# Patient Record
Sex: FEMALE | Race: WHITE | Employment: FULL TIME | URBAN - METROPOLITAN AREA
[De-identification: names, ages, dates, MRNs, and addresses within clinical notes are randomized per-mention and may not be internally consistent; named-entity substitution may affect disease eponyms.]

---

## 2018-04-27 ENCOUNTER — OFFICE VISIT (OUTPATIENT)
Dept: SURGERY | Age: 53
End: 2018-04-27

## 2018-04-27 ENCOUNTER — DOCUMENTATION ONLY (OUTPATIENT)
Dept: SURGERY | Age: 53
End: 2018-04-27

## 2018-04-27 VITALS
BODY MASS INDEX: 21.53 KG/M2 | WEIGHT: 134 LBS | HEART RATE: 102 BPM | HEIGHT: 66 IN | DIASTOLIC BLOOD PRESSURE: 67 MMHG | SYSTOLIC BLOOD PRESSURE: 131 MMHG

## 2018-04-27 DIAGNOSIS — D05.11 DUCTAL CARCINOMA IN SITU (DCIS) OF RIGHT BREAST: Primary | ICD-10-CM

## 2018-04-27 NOTE — PROGRESS NOTES
HISTORY OF PRESENT ILLNESS  Viviane Conroy is a 50 y.o. female. HPI  NEW patient presents for consultation at the request of Dr. Antoine Sarmiento for new diagnosis of RIGHT breast DCIS diagnosed after calcifications were seen on her recent mammogram.  Stereotactic biopsy was performed which revealed the DCIS. She has already had a breast MRI in [place]. She is not feeling any breast lumps, has no breast pain, no nipple discharge/retraction or skin change. She had calcifications biopsied in the LEFT breast in 2010, and these were benign. There is no FH of breast or ovarian cancer. Recent imaging has been performed at [facility]    Past Medical History:   Diagnosis Date    Cancer [facility]) 2018    RIGHT breast DCIS       No past surgical history on file. Social History     Social History    Marital status: UNKNOWN     Spouse name: N/A    Number of children: N/A    Years of education: N/A     Occupational History    Not on file. Social History Main Topics    Smoking status: Never Smoker    Smokeless tobacco: Never Used    Alcohol use No    Drug use: No    Sexual activity: Not on file     Other Topics Concern    Not on file     Social History Narrative    No narrative on file       No current outpatient prescriptions on file prior to visit. No current facility-administered medications on file prior to visit. No Known Allergies    OB History     Obstetric Comments     Menarche:  8. LMP: June/July 2017. # of Children:  3. Age at Delivery of First Child:  34.   Hysterectomy/oophorectomy:  NO/NO. Breast Bx:  Yes, LEFT and RIGHT. Hx of Breast Feeding:  No  BCP:  Yes, in the past. Hormone therapy: No                            ROS  Constitutional: Negative. HENT: Negative. Eyes: Negative. Respiratory: Negative. Cardiovascular: Negative. Gastrointestinal: Negative. Genitourinary: Negative. Musculoskeletal: Negative. Skin: Negative. Neurological: Negative. Endo/Heme/Allergies: Negative. Psychiatric/Behavioral: Negative. Physical Exam   Cardiovascular: Normal rate, regular rhythm and normal heart sounds. Pulmonary/Chest: Breath sounds normal. Right breast exhibits no inverted nipple, no mass, no nipple discharge, no skin change and no tenderness. Left breast exhibits no inverted nipple, no mass, no nipple discharge, no skin change and no tenderness. Breasts are symmetrical.       Lymphadenopathy:        Right cervical: No superficial cervical, no deep cervical and no posterior cervical adenopathy present. Left cervical: No superficial cervical, no deep cervical and no posterior cervical adenopathy present. She has no axillary adenopathy. Right axillary: No pectoral and no lateral adenopathy present. Left axillary: No pectoral and no lateral adenopathy present. BREAST ULTRASOUND, Preop planning  Indication:preop planning  right Breast upper outer quadrant   Technique: The area was scanned using a high-frequency linear-array near-field transducer  Findings: Sire barely visible  Impression: Breast cancer  Disposition: Lumpectomy with needle loc    ASSESSMENT and PLAN    ICD-10-CM ICD-9-CM    1. Ductal carcinoma in situ (DCIS) of right breast D05.11 233.0       Pt presents with new diagnosis of RIGHT breast DCIS. We reviewed that it is a small area of disease, and MRI appears normal. As UOQ site is barely visible on US today, I recommend lumpectomy with needle loc. A total of 60 minutes were spent face-to-face with the patient and her  during this encounter and over half of that time was spent on counseling and coordination of care. We discussed in depth the pathology results and need for surgery following MRI for extent of disease and surgical planning, as well as questions from the pt and her .  Reviewed that DCIS is a noninvasive form of breast cancer, but is a high-risk marker for development of invasive breast cancer. Goal of treatment is to reduce risk of recurrence within the breast.     Discussed  options with risks and complications of lumpectomy with needle loc and possible XRT, as well as mastectomy with reconstruction, both unilateral and bilateral with inherent benefits and limitations, both having the same cure rates. Discussed surgery plan to include incision above nipple around areola, and placement of BioZorb. This monique act as a 3D target for XRT, and will also promote better cosmetic results as a \"scaffolding\" for breast tissue. Discussed the use of DCISionRT for risk assessment of recurrence with and without XRT. If lumpectomy margins are positive, we will proceed with a secondary surgery for excision. We reviewed how margins are evaluated during surgery and in the pathology lab. We will f/u with results after surgery. Discussed LNs - she does not need these checked as it is non-invasive cancer. If invasive cancer is demonstrated with PATH, we will check LNs in a second surgery. We discussed adjuvant treatments including XRT, chemo, and hormonal therapy. If invasive breast cancer is found during surgery, we will proceed with XRT. If invasive cancer is not found, I will refer pt to radiation oncologist for consultation as to whether or not to proceed with XRT. At this time, chemo is not likely necessary. We will further review the need for hormonal therapy following surgery and PATH. We had a thorough discussion of pt's questions. Dense breast tissue is troublesome in that it makes mammograms harder to read, but is only a minor risk factor for breast cancer. Because of density, we will need to make sure margins are clear, as it is sometimes harder to differentiate dense and cancerous tissues. Reviewed that \"high grade\" classification will be reevaluated following lumpectomy. Pt has 2 sisters, and wonders whether BRCA testing is necessary.  There is no other family hx of breast or ovarian cancer, and likelihood of gene mutation is low. Pt would like to proceed quickly for her peace of mind. We will schedule lumpectomy in the near future. RIGHT mammo to follow 6 months after surgery. BL annual mammos to resume 1 year following surgery. This plan was reviewed with the patient and patient agrees. All questions were answered.     Written by Maria Isabel Eldridge, as dictated by Dr. Love Beltrán MD.

## 2018-04-27 NOTE — PATIENT INSTRUCTIONS
Learning About Breast Cancer Surgery  What is breast cancer surgery? Surgery is a key part of treatment for breast cancer. The type of surgery you have depends on the size, location, and type of the cancer. It also depends on your overall health and personal preferences. You may have surgery that removes the tumor but does not remove the whole breast. Or you may have surgery to remove the whole breast.  Breast cancer usually needs a combination of treatments. You may have surgery to remove all the cancer that can be seen. But after surgery you may also need radiation, chemotherapy, or hormone therapy. These treatments get rid of any cancer cells that may be left. What surgeries are used? · Surgery that does not remove the whole breast is called breast-conserving surgery (lumpectomy). It removes the tumor in the breast and a small amount of normal tissue around it. It is also called a partial mastectomy. · Surgeries to remove the whole breast are called:  ¨ Total mastectomy. This removes the whole breast, including the nipple. ¨ Nipple-sparing mastectomy. This removes the whole breast but leaves the nipple. ¨ Modified radical mastectomy. This removes the whole breast and the lymph nodes under the arm (axillary lymph nodes). ¨ Radical mastectomy. This removes the whole breast, the chest muscles under the breast, and all the lymph nodes under the arm. During the surgery, the doctor may remove lymph nodes from the armpit. The lymph nodes will be looked at under a microscope. This is used to check if cancer has spread from the breast into the lymph nodes. There are two types of lymph node surgery:  · Axillary lymph node dissection. This removes some or all of the lymph nodes in the armpit. · [place] lymph node biopsy. This removes the lymph nodes that are the first to receive lymph fluid from the tumor. If the sentinel nodes don't contain cancer, it is unlikely that the cancer has spread.  Then the doctor won't remove any more lymph nodes. If there is cancer in the sentinel nodes, the doctor may remove other nearby lymph nodes. What can you expect after surgery? Your doctor will send the breast tissue to a lab for testing. This will help the doctor know more about the type of cancer you have. It may take up to a week or more to get the results back. Your doctor will discuss the results with you. You may meet with a doctor who specializes in cancer treatment (an oncologist) to decide about any other treatment you may need. Your personal preferences are important when choosing a treatment that is right for you. The amount of time you will need to recover depends on the type of surgery you had and whether you need any more treatment. After a mastectomy, some women choose to have surgery to restore the way the breast looked. This surgery is called reconstruction. It is done by a plastic surgeon. It can sometimes be done at the same time as the mastectomy. Or you may choose to have it done later. Some women choose to wear a breast-form (prosthesis) in their bra instead of having reconstructive surgery. Others decide to go without a prosthesis or reconstructive surgery. You choose what feels right for you. If you had a lumpectomy, you will probably look the same in a bra. But your breasts may not match in size or shape after surgery. This depends on the size of your breasts and how much tissue was removed. No matter what kind of surgery you have, you will get information about your treatment. This includes how to prepare, what to expect, and what to do afterward. Follow-up care is a key part of your treatment and safety. Be sure to make and go to all appointments, and call your doctor if you are having problems. It's also a good idea to know your test results and keep a list of the medicines you take. Where can you learn more? Go to http://marguerite-naif.info/.   Enter P020 in the search box to learn more about \"Learning About Breast Cancer Surgery. \"  Current as of: May 12, 2017  Content Version: 11.4  © 8383-1310 [employer], [employer]. Care instructions adapted under license by [employer] (which disclaims liability or warranty for this information). If you have questions about a medical condition or this instruction, always ask your healthcare professional. [address] any warranty or liability for your use of this information.

## 2018-04-27 NOTE — PROGRESS NOTES
Type of Film: [x] CD [] FILMS  Type of Test: [x] MRI [x] MAMMO  From: [facility]  Given to: [facility]  To be Downloaded into PACS:  YES    Patient needs a needle localization

## 2018-04-27 NOTE — PROGRESS NOTES
HISTORY OF PRESENT ILLNESS  Jimbo Hernandez is a 50 y.o. female. HPI    NEW patient presents for consultation at the request of Dr. Lola Cao for new diagnosis of RIGHT breast DCIS diagnosed after calcifications were seen on her recent mammogram.  Stereotactic biopsy was performed which revealed the DCIS. She has already had a breast MRI in [place]. She is not feeling any breast lumps, has no breast pain, no nipple discharge/retraction or skin change. She had calcifications biopsied in the LEFT breast in 2010, and these were benign. There is no FH of breast or ovarian cancer. [facility]    Review of Systems   Constitutional: Negative. HENT: Negative. Eyes: Negative. Respiratory: Negative. Cardiovascular: Negative. Gastrointestinal: Negative. Genitourinary: Negative. Musculoskeletal: Negative. Skin: Negative. Neurological: Negative. Endo/Heme/Allergies: Negative. Psychiatric/Behavioral: Negative.         Physical Exam    ASSESSMENT and PLAN  {ASSESSMENT/PLAN:48239}

## 2018-04-27 NOTE — LETTER
4/30/2018 9:56 AM 
 
Patient:  Alyce Mark YOB: 1965 Date of Visit: 4/27/2018 Dear Dr. Amber Quarles: Thank you for referring Ms. Alyce Mark to me for evaluation/treatment. Below are the relevant portions of my assessment and plan of care. HISTORY OF PRESENT ILLNESS Alyce Mark is a 50 y.o. female. HPI 
NEW patient presents for consultation at the request of Dr. Viry Dougherty for new diagnosis of RIGHT breast DCIS diagnosed after calcifications were seen on her recent mammogram.  Stereotactic biopsy was performed which revealed the DCIS. She has already had a breast MRI in [place]. She is not feeling any breast lumps, has no breast pain, no nipple discharge/retraction or skin change. She had calcifications biopsied in the LEFT breast in 2010, and these were benign. There is no FH of breast or ovarian cancer. Recent imaging has been performed at [facility] Past Medical History:  
Diagnosis Date  Cancer ([facility] [address] 2018 RIGHT breast DCIS No past surgical history on file. Social History Social History  Marital status: UNKNOWN Spouse name: N/A  
 Number of children: N/A  
 Years of education: N/A Occupational History  Not on file. Social History Main Topics  Smoking status: Never Smoker  Smokeless tobacco: Never Used  Alcohol use No  
 Drug use: No  
 Sexual activity: Not on file Other Topics Concern  Not on file Social History Narrative  No narrative on file No current outpatient prescriptions on file prior to visit. No current facility-administered medications on file prior to visit. No Known Allergies OB History Obstetric Comments Menarche:  8. LMP: June/July 2017. # of Children:  3. Age at Delivery of First Child:  34.   Hysterectomy/oophorectomy:  NO/NO. Breast Bx:  Yes, LEFT and RIGHT.   Hx of Breast Feeding:  No  BCP:  Yes, in the past. Hormone therapy: No 
  
 
 
 
 
 
  
  
  
 
 
ROS Constitutional: Negative. HENT: Negative. Eyes: Negative. Respiratory: Negative. Cardiovascular: Negative. Gastrointestinal: Negative. Genitourinary: Negative. Musculoskeletal: Negative. Skin: Negative. Neurological: Negative. Endo/Heme/Allergies: Negative. Psychiatric/Behavioral: Negative. Physical Exam  
Cardiovascular: Normal rate, regular rhythm and normal heart sounds. Pulmonary/Chest: Breath sounds normal. Right breast exhibits no inverted nipple, no mass, no nipple discharge, no skin change and no tenderness. Left breast exhibits no inverted nipple, no mass, no nipple discharge, no skin change and no tenderness. Breasts are symmetrical.  
 
 
Lymphadenopathy:  
     Right cervical: No superficial cervical, no deep cervical and no posterior cervical adenopathy present. Left cervical: No superficial cervical, no deep cervical and no posterior cervical adenopathy present. She has no axillary adenopathy. Right axillary: No pectoral and no lateral adenopathy present. Left axillary: No pectoral and no lateral adenopathy present. BREAST ULTRASOUND, Preop planning Indication:preop planning  right Breast upper outer quadrant Technique: The area was scanned using a high-frequency linear-array near-field transducer Findings: Sire barely visible Impression: Breast cancer Disposition: Lumpectomy with needle loc ASSESSMENT and PLAN 
  ICD-10-CM ICD-9-CM 1. Ductal carcinoma in situ (DCIS) of right breast D05.11 233.0 Pt presents with new diagnosis of RIGHT breast DCIS. We reviewed that it is a small area of disease, and MRI appears normal. As UOQ site is barely visible on US today, I recommend lumpectomy with needle loc.  
A total of 60 minutes were spent face-to-face with the patient and her  during this encounter and over half of that time was spent on counseling and coordination of care. We discussed in depth the pathology results and need for surgery following MRI for extent of disease and surgical planning, as well as questions from the pt and her . Reviewed that DCIS is a noninvasive form of breast cancer, but is a high-risk marker for development of invasive breast cancer. Goal of treatment is to reduce risk of recurrence within the breast.  
 
Discussed  options with risks and complications of lumpectomy with needle loc and possible XRT, as well as mastectomy with reconstruction, both unilateral and bilateral with inherent benefits and limitations, both having the same cure rates. Discussed surgery plan to include incision above nipple around areola, and placement of BioZorb. This monique act as a 3D target for XRT, and will also promote better cosmetic results as a \"scaffolding\" for breast tissue. Discussed the use of DCISionRT for risk assessment of recurrence with and without XRT. If lumpectomy margins are positive, we will proceed with a secondary surgery for excision. We reviewed how margins are evaluated during surgery and in the pathology lab. We will f/u with results after surgery. Discussed LNs - she does not need these checked as it is non-invasive cancer. If invasive cancer is demonstrated with PATH, we will check LNs in a second surgery. We discussed adjuvant treatments including XRT, chemo, and hormonal therapy. If invasive breast cancer is found during surgery, we will proceed with XRT. If invasive cancer is not found, I will refer pt to radiation oncologist for consultation as to whether or not to proceed with XRT. At this time, chemo is not likely necessary. We will further review the need for hormonal therapy following surgery and PATH. We had a thorough discussion of pt's questions.  Dense breast tissue is troublesome in that it makes mammograms harder to read, but is only a minor risk factor for breast cancer. Because of density, we will need to make sure margins are clear, as it is sometimes harder to differentiate dense and cancerous tissues. Reviewed that \"high grade\" classification will be reevaluated following lumpectomy. Pt has 2 sisters, and wonders whether BRCA testing is necessary. There is no other family hx of breast or ovarian cancer, and likelihood of gene mutation is low. Pt would like to proceed quickly for her peace of mind. We will schedule lumpectomy in the near future. RIGHT mammo to follow 6 months after surgery. BL annual mammos to resume 1 year following surgery. This plan was reviewed with the patient and patient agrees. All questions were answered. Written by Stalin La, as dictated by Dr. Nicole Taylor MD. 
 
If you have questions, please do not hesitate to call me. I look forward to following Ms. rBee Joyce along with you.  
 
 
 
Sincerely, 
 
 
Lorin Elder MD

## 2018-04-30 NOTE — COMMUNICATION BODY
HISTORY OF PRESENT ILLNESS  Aidee Valerio is a 50 y.o. female. HPI  NEW patient presents for consultation at the request of Dr. Jomar Omalley for new diagnosis of RIGHT breast DCIS diagnosed after calcifications were seen on her recent mammogram.  Stereotactic biopsy was performed which revealed the DCIS. She has already had a breast MRI in [place]. She is not feeling any breast lumps, has no breast pain, no nipple discharge/retraction or skin change. She had calcifications biopsied in the LEFT breast in 2010, and these were benign. There is no FH of breast or ovarian cancer. Recent imaging has been performed at [facility]    Past Medical History:   Diagnosis Date    Cancer [facility]) 2018    RIGHT breast DCIS       No past surgical history on file. Social History     Social History    Marital status: UNKNOWN     Spouse name: N/A    Number of children: N/A    Years of education: N/A     Occupational History    Not on file. Social History Main Topics    Smoking status: Never Smoker    Smokeless tobacco: Never Used    Alcohol use No    Drug use: No    Sexual activity: Not on file     Other Topics Concern    Not on file     Social History Narrative    No narrative on file       No current outpatient prescriptions on file prior to visit. No current facility-administered medications on file prior to visit. No Known Allergies    OB History     Obstetric Comments     Menarche:  8. LMP: June/July 2017. # of Children:  3. Age at Delivery of First Child:  34.   Hysterectomy/oophorectomy:  NO/NO. Breast Bx:  Yes, LEFT and RIGHT. Hx of Breast Feeding:  No  BCP:  Yes, in the past. Hormone therapy: No                            ROS  Constitutional: Negative. HENT: Negative. Eyes: Negative. Respiratory: Negative. Cardiovascular: Negative. Gastrointestinal: Negative. Genitourinary: Negative. Musculoskeletal: Negative. Skin: Negative. Neurological: Negative. Endo/Heme/Allergies: Negative. Psychiatric/Behavioral: Negative. Physical Exam   Cardiovascular: Normal rate, regular rhythm and normal heart sounds. Pulmonary/Chest: Breath sounds normal. Right breast exhibits no inverted nipple, no mass, no nipple discharge, no skin change and no tenderness. Left breast exhibits no inverted nipple, no mass, no nipple discharge, no skin change and no tenderness. Breasts are symmetrical.       Lymphadenopathy:        Right cervical: No superficial cervical, no deep cervical and no posterior cervical adenopathy present. Left cervical: No superficial cervical, no deep cervical and no posterior cervical adenopathy present. She has no axillary adenopathy. Right axillary: No pectoral and no lateral adenopathy present. Left axillary: No pectoral and no lateral adenopathy present. BREAST ULTRASOUND, Preop planning  Indication:preop planning  right Breast upper outer quadrant   Technique: The area was scanned using a high-frequency linear-array near-field transducer  Findings: Sire barely visible  Impression: Breast cancer  Disposition: Lumpectomy with needle loc    ASSESSMENT and PLAN    ICD-10-CM ICD-9-CM    1. Ductal carcinoma in situ (DCIS) of right breast D05.11 233.0       Pt presents with new diagnosis of RIGHT breast DCIS. We reviewed that it is a small area of disease, and MRI appears normal. As UOQ site is barely visible on US today, I recommend lumpectomy with needle loc. A total of 60 minutes were spent face-to-face with the patient and her  during this encounter and over half of that time was spent on counseling and coordination of care. We discussed in depth the pathology results and need for surgery following MRI for extent of disease and surgical planning, as well as questions from the pt and her .  Reviewed that DCIS is a noninvasive form of breast cancer, but is a high-risk marker for development of invasive breast cancer. Goal of treatment is to reduce risk of recurrence within the breast.     Discussed  options with risks and complications of lumpectomy with needle loc and possible XRT, as well as mastectomy with reconstruction, both unilateral and bilateral with inherent benefits and limitations, both having the same cure rates. Discussed surgery plan to include incision above nipple around areola, and placement of BioZorb. This monique act as a 3D target for XRT, and will also promote better cosmetic results as a \"scaffolding\" for breast tissue. Discussed the use of DCISionRT for risk assessment of recurrence with and without XRT. If lumpectomy margins are positive, we will proceed with a secondary surgery for excision. We reviewed how margins are evaluated during surgery and in the pathology lab. We will f/u with results after surgery. Discussed LNs - she does not need these checked as it is non-invasive cancer. If invasive cancer is demonstrated with PATH, we will check LNs in a second surgery. We discussed adjuvant treatments including XRT, chemo, and hormonal therapy. If invasive breast cancer is found during surgery, we will proceed with XRT. If invasive cancer is not found, I will refer pt to radiation oncologist for consultation as to whether or not to proceed with XRT. At this time, chemo is not likely necessary. We will further review the need for hormonal therapy following surgery and PATH. We had a thorough discussion of pt's questions. Dense breast tissue is troublesome in that it makes mammograms harder to read, but is only a minor risk factor for breast cancer. Because of density, we will need to make sure margins are clear, as it is sometimes harder to differentiate dense and cancerous tissues. Reviewed that \"high grade\" classification will be reevaluated following lumpectomy. Pt has 2 sisters, and wonders whether BRCA testing is necessary.  There is no other family hx of breast or ovarian cancer, and likelihood of gene mutation is low. Pt would like to proceed quickly for her peace of mind. We will schedule lumpectomy in the near future. RIGHT mammo to follow 6 months after surgery. BL annual mammos to resume 1 year following surgery. This plan was reviewed with the patient and patient agrees. All questions were answered.     Written by Vipul Berry, as dictated by Dr. Delano Morley MD.

## 2018-05-01 DIAGNOSIS — D05.11 DUCTAL CARCINOMA IN SITU OF RIGHT BREAST: Primary | ICD-10-CM

## 2018-05-01 RX ORDER — IBUPROFEN 200 MG
TABLET ORAL
COMMUNITY

## 2018-05-01 NOTE — PERIOP NOTES
Patient states she had chest pain/pressure May 2017. Patient stated  she had a stress test May 2017 and it was normal. Patient states she will bring a copy of the stress test results and recent lab studies for her paper medical record.

## 2018-05-01 NOTE — PERIOP NOTES
[facility]  PREOPERATIVE INSTRUCTIONS    Surgery Date:   5/15/18     1. Surgery arrival time given by surgeon: YES 0745  2. Report  to the 1st  Floor Admitting Desk on the day of your surgery. Bring your insurance card, photo identification, and any copayment (if applicable). 3. You must have a responsible adult to drive you home and stay with you the first 24 hours after surgery if you are going home the same day of your surgery. 4. Nothing to eat or drink after midnight the night before surgery. This means NO water, gum, mints, coffee, juice, etc.    5. MEDICATIONS TO TAKE THE MORNING OF SURGERY WITH A SIP OF WATER:none  6. No alcoholic beverages 24 hours before and after your surgery. 7. If you are being admitted to the hospital,please leave personal belongings/luggage in your car until you have an assigned hospital room number. ( The hospital discharge time is 12 PM NOON. Your adult  should be at the hospital prior to the noon discharge time unless otherwise instructed.)   8. STOP Aspirin and/or any non-steroidal anti-inflammatory drugs (i.e. Ibuprofen, Naproxen, Advil, Aleve) as directed by your surgeon. You may take Tylenol. Stop herbal supplements 1 week prior to  surgery. 9. If you are currently taking Plavix, Coumadin,or any other blood-thinning/ anticoagulant medication contact your surgeon for instructions. 10. Wear comfortable clothes. Wear your glasses instead of contacts. Please leave all money, jewelry and valuables at home. No make up, particularly mascara, the day of surgery. 11.  REMOVE ALL body piercings, rings,and jewelry and leave at home. Wear your hair loose or down, no pony-tails, buns, or any metal hair clips. 12. If you shower the morning of surgery, please do not apply any lotions, powders, or deodorants afterwards. Do not shave any body area within 24 hours of your surgery. 13. Please follow all instructions to avoid any potential surgical cancellation.   14.  Should your physical condition change, (i.e. fever, cold, flu, etc.) please notify your surgeon as soon as possible. 15. It is important to be on time. If a situation occurs where you may be delayed, please call:  [phone] on the day of surgery. 16. The Preadmission Testing staff can be reached at 21 [phone]. 17. Special instructions: Free  parking,wear bra for support  18. The patient was contacted  via phone. She  verbalize  understanding of all instructions does not  need reinforcement.

## 2018-05-10 ENCOUNTER — DOCUMENTATION ONLY (OUTPATIENT)
Dept: SURGERY | Age: 53
End: 2018-05-10

## 2018-05-10 NOTE — PROGRESS NOTES
Patient called and left message to return her call. I called but there was no answer. I left a message to return my call at [phone] ext. [phone]  Patients records have been scanned into her chart.

## 2018-05-11 ENCOUNTER — DOCUMENTATION ONLY (OUTPATIENT)
Dept: SURGERY | Age: 53
End: 2018-05-11

## 2018-05-11 NOTE — PROGRESS NOTES
Patient called on Thursday with a question about her medical records. I tried to call her back but only left a message. I called her again today and spoke with her and let her know the records were scanned into her chart. I also called Clemente Villegas in PAT at [facility], to inform the department the  records were there as requested I believe by David Hirsch.

## 2018-05-14 ENCOUNTER — ANESTHESIA EVENT (OUTPATIENT)
Dept: SURGERY | Age: 53
End: 2018-05-14
Payer: COMMERCIAL

## 2018-05-15 ENCOUNTER — HOSPITAL ENCOUNTER (OUTPATIENT)
Age: 53
Setting detail: OUTPATIENT SURGERY
Discharge: HOME | End: 2018-05-15
Attending: SURGERY | Admitting: SURGERY
Payer: COMMERCIAL

## 2018-05-15 ENCOUNTER — APPOINTMENT (OUTPATIENT)
Dept: GENERAL RADIOLOGY | Age: 53
End: 2018-05-15
Attending: SURGERY
Payer: COMMERCIAL

## 2018-05-15 ENCOUNTER — HOSPITAL ENCOUNTER (OUTPATIENT)
Dept: MAMMOGRAPHY | Age: 53
Discharge: HOME | End: 2018-05-15
Attending: SURGERY
Payer: COMMERCIAL

## 2018-05-15 ENCOUNTER — ANESTHESIA (OUTPATIENT)
Dept: SURGERY | Age: 53
End: 2018-05-15
Payer: COMMERCIAL

## 2018-05-15 VITALS
SYSTOLIC BLOOD PRESSURE: 123 MMHG | WEIGHT: 134.92 LBS | HEIGHT: 66 IN | DIASTOLIC BLOOD PRESSURE: 75 MMHG | TEMPERATURE: 98.1 F | RESPIRATION RATE: 13 BRPM | BODY MASS INDEX: 21.68 KG/M2 | HEART RATE: 78 BPM | OXYGEN SATURATION: 95 %

## 2018-05-15 DIAGNOSIS — D05.11 DUCTAL CARCINOMA IN SITU OF RIGHT BREAST: ICD-10-CM

## 2018-05-15 PROCEDURE — 77030020782 HC GWN BAIR PAWS FLX 3M -B

## 2018-05-15 PROCEDURE — 77030018836 HC SOL IRR NACL ICUM -A: Performed by: SURGERY

## 2018-05-15 PROCEDURE — 77030011267 HC ELECTRD BLD COVD -A: Performed by: SURGERY

## 2018-05-15 PROCEDURE — 76210000034 HC AMBSU PH I REC 0.5 TO 1 HR: Performed by: SURGERY

## 2018-05-15 PROCEDURE — 76030000001 HC AMB SURG OR TIME 1 TO 1.5: Performed by: SURGERY

## 2018-05-15 PROCEDURE — 77030002933 HC SUT MCRYL J&J -A: Performed by: SURGERY

## 2018-05-15 PROCEDURE — 88307 TISSUE EXAM BY PATHOLOGIST: CPT | Performed by: SURGERY

## 2018-05-15 PROCEDURE — 77030003460 HC NDL BIOP BRST COOK -B

## 2018-05-15 PROCEDURE — 77030032490 HC SLV COMPR SCD KNE COVD -B: Performed by: SURGERY

## 2018-05-15 PROCEDURE — 77030002966 HC SUT PDS J&J -A: Performed by: SURGERY

## 2018-05-15 PROCEDURE — 74011000250 HC RX REV CODE- 250: Performed by: SURGERY

## 2018-05-15 PROCEDURE — 76060000062 HC AMB SURG ANES 1 TO 1.5 HR: Performed by: SURGERY

## 2018-05-15 PROCEDURE — 74011250636 HC RX REV CODE- 250/636: Performed by: SURGERY

## 2018-05-15 PROCEDURE — 74011250636 HC RX REV CODE- 250/636

## 2018-05-15 PROCEDURE — 76000 FLUOROSCOPY <1 HR PHYS/QHP: CPT

## 2018-05-15 PROCEDURE — 74011250636 HC RX REV CODE- 250/636: Performed by: ANESTHESIOLOGY

## 2018-05-15 PROCEDURE — 88360 TUMOR IMMUNOHISTOCHEM/MANUAL: CPT | Performed by: SURGERY

## 2018-05-15 PROCEDURE — 77030039266 HC ADH SKN EXOFIN S2SG -A: Performed by: SURGERY

## 2018-05-15 PROCEDURE — A4648 IMPLANTABLE TISSUE MARKER: HCPCS | Performed by: SURGERY

## 2018-05-15 PROCEDURE — 77030020143 HC AIRWY LARYN INTUB CGAS -A: Performed by: ANESTHESIOLOGY

## 2018-05-15 PROCEDURE — 74011000250 HC RX REV CODE- 250: Performed by: RADIOLOGY

## 2018-05-15 PROCEDURE — 74011000250 HC RX REV CODE- 250

## 2018-05-15 PROCEDURE — 76210000050 HC AMBSU PH II REC 0.5 TO 1 HR: Performed by: SURGERY

## 2018-05-15 PROCEDURE — 77030002996 HC SUT SLK J&J -A: Performed by: SURGERY

## 2018-05-15 PROCEDURE — 77030031139 HC SUT VCRL2 J&J -A: Performed by: SURGERY

## 2018-05-15 PROCEDURE — 19281 PERQ DEVICE BREAST 1ST IMAG: CPT

## 2018-05-15 DEVICE — THE MARKER IS A RADIOGRAPHIC IMPLANTABLE MARKER USED TO MARK SOFT TISSUE.IT IS COMPRISED OF A BIOABSORBABLE SPACER THAT HOLDS RADIOPAQUE MARKER CLIPS.
Type: IMPLANTABLE DEVICE | Site: BREAST | Status: FUNCTIONAL
Brand: BIOZORB LP MARKER

## 2018-05-15 RX ORDER — PROPOFOL 10 MG/ML
INJECTION, EMULSION INTRAVENOUS
Status: DISCONTINUED | OUTPATIENT
Start: 2018-05-15 | End: 2018-05-15 | Stop reason: HOSPADM

## 2018-05-15 RX ORDER — LIDOCAINE HYDROCHLORIDE 20 MG/ML
INJECTION, SOLUTION EPIDURAL; INFILTRATION; INTRACAUDAL; PERINEURAL AS NEEDED
Status: DISCONTINUED | OUTPATIENT
Start: 2018-05-15 | End: 2018-05-15 | Stop reason: HOSPADM

## 2018-05-15 RX ORDER — SODIUM CHLORIDE, SODIUM LACTATE, POTASSIUM CHLORIDE, CALCIUM CHLORIDE 600; 310; 30; 20 MG/100ML; MG/100ML; MG/100ML; MG/100ML
100 INJECTION, SOLUTION INTRAVENOUS CONTINUOUS
Status: DISCONTINUED | OUTPATIENT
Start: 2018-05-15 | End: 2018-05-15 | Stop reason: HOSPADM

## 2018-05-15 RX ORDER — ONDANSETRON 2 MG/ML
INJECTION INTRAMUSCULAR; INTRAVENOUS AS NEEDED
Status: DISCONTINUED | OUTPATIENT
Start: 2018-05-15 | End: 2018-05-15 | Stop reason: HOSPADM

## 2018-05-15 RX ORDER — LIDOCAINE HYDROCHLORIDE AND EPINEPHRINE 10; 10 MG/ML; UG/ML
30 INJECTION, SOLUTION INFILTRATION; PERINEURAL ONCE
Status: DISCONTINUED | OUTPATIENT
Start: 2018-05-15 | End: 2018-05-15 | Stop reason: HOSPADM

## 2018-05-15 RX ORDER — CEFAZOLIN SODIUM/WATER 2 G/20 ML
2 SYRINGE (ML) INTRAVENOUS
Status: COMPLETED | OUTPATIENT
Start: 2018-05-15 | End: 2018-05-15

## 2018-05-15 RX ORDER — SODIUM CHLORIDE 0.9 % (FLUSH) 0.9 %
5-10 SYRINGE (ML) INJECTION AS NEEDED
Status: DISCONTINUED | OUTPATIENT
Start: 2018-05-15 | End: 2018-05-15 | Stop reason: HOSPADM

## 2018-05-15 RX ORDER — DEXAMETHASONE SODIUM PHOSPHATE 4 MG/ML
INJECTION, SOLUTION INTRA-ARTICULAR; INTRALESIONAL; INTRAMUSCULAR; INTRAVENOUS; SOFT TISSUE AS NEEDED
Status: DISCONTINUED | OUTPATIENT
Start: 2018-05-15 | End: 2018-05-15 | Stop reason: HOSPADM

## 2018-05-15 RX ORDER — HYDROMORPHONE HYDROCHLORIDE 2 MG/ML
.25-1 INJECTION, SOLUTION INTRAMUSCULAR; INTRAVENOUS; SUBCUTANEOUS
Status: DISCONTINUED | OUTPATIENT
Start: 2018-05-15 | End: 2018-05-15 | Stop reason: HOSPADM

## 2018-05-15 RX ORDER — LIDOCAINE HYDROCHLORIDE 10 MG/ML
4 INJECTION, SOLUTION EPIDURAL; INFILTRATION; INTRACAUDAL; PERINEURAL
Status: COMPLETED | OUTPATIENT
Start: 2018-05-15 | End: 2018-05-15

## 2018-05-15 RX ORDER — SODIUM CHLORIDE 0.9 % (FLUSH) 0.9 %
5-10 SYRINGE (ML) INJECTION EVERY 8 HOURS
Status: DISCONTINUED | OUTPATIENT
Start: 2018-05-15 | End: 2018-05-15 | Stop reason: HOSPADM

## 2018-05-15 RX ORDER — FAMOTIDINE 10 MG/ML
INJECTION INTRAVENOUS AS NEEDED
Status: DISCONTINUED | OUTPATIENT
Start: 2018-05-15 | End: 2018-05-15 | Stop reason: HOSPADM

## 2018-05-15 RX ORDER — PROPOFOL 10 MG/ML
INJECTION, EMULSION INTRAVENOUS AS NEEDED
Status: DISCONTINUED | OUTPATIENT
Start: 2018-05-15 | End: 2018-05-15 | Stop reason: HOSPADM

## 2018-05-15 RX ORDER — MIDAZOLAM HYDROCHLORIDE 1 MG/ML
INJECTION, SOLUTION INTRAMUSCULAR; INTRAVENOUS AS NEEDED
Status: DISCONTINUED | OUTPATIENT
Start: 2018-05-15 | End: 2018-05-15 | Stop reason: HOSPADM

## 2018-05-15 RX ORDER — FENTANYL CITRATE 50 UG/ML
INJECTION, SOLUTION INTRAMUSCULAR; INTRAVENOUS AS NEEDED
Status: DISCONTINUED | OUTPATIENT
Start: 2018-05-15 | End: 2018-05-15 | Stop reason: HOSPADM

## 2018-05-15 RX ORDER — HYDROCODONE BITARTRATE AND ACETAMINOPHEN 7.5; 325 MG/1; MG/1
1 TABLET ORAL
Qty: 25 TAB | Refills: 0 | Status: SHIPPED | OUTPATIENT
Start: 2018-05-15 | End: 2019-06-25 | Stop reason: ALTCHOICE

## 2018-05-15 RX ORDER — BUPIVACAINE HYDROCHLORIDE AND EPINEPHRINE 5; 5 MG/ML; UG/ML
30 INJECTION, SOLUTION EPIDURAL; INTRACAUDAL; PERINEURAL ONCE
Status: DISCONTINUED | OUTPATIENT
Start: 2018-05-15 | End: 2018-05-15 | Stop reason: HOSPADM

## 2018-05-15 RX ORDER — LIDOCAINE HYDROCHLORIDE 10 MG/ML
0.1 INJECTION, SOLUTION EPIDURAL; INFILTRATION; INTRACAUDAL; PERINEURAL AS NEEDED
Status: DISCONTINUED | OUTPATIENT
Start: 2018-05-15 | End: 2018-05-15 | Stop reason: HOSPADM

## 2018-05-15 RX ADMIN — SODIUM CHLORIDE, SODIUM LACTATE, POTASSIUM CHLORIDE, AND CALCIUM CHLORIDE: 600; 310; 30; 20 INJECTION, SOLUTION INTRAVENOUS at 11:35

## 2018-05-15 RX ADMIN — LIDOCAINE HYDROCHLORIDE 1 ML: 10 INJECTION, SOLUTION EPIDURAL; INFILTRATION; INTRACAUDAL; PERINEURAL at 10:05

## 2018-05-15 RX ADMIN — FAMOTIDINE 20 MG: 10 INJECTION INTRAVENOUS at 10:57

## 2018-05-15 RX ADMIN — PROPOFOL 120 MCG/KG/MIN: 10 INJECTION, EMULSION INTRAVENOUS at 10:59

## 2018-05-15 RX ADMIN — Medication 2 G: at 11:03

## 2018-05-15 RX ADMIN — SODIUM CHLORIDE, SODIUM LACTATE, POTASSIUM CHLORIDE, AND CALCIUM CHLORIDE 100 ML/HR: 600; 310; 30; 20 INJECTION, SOLUTION INTRAVENOUS at 10:17

## 2018-05-15 RX ADMIN — FENTANYL CITRATE 50 MCG: 50 INJECTION, SOLUTION INTRAMUSCULAR; INTRAVENOUS at 11:10

## 2018-05-15 RX ADMIN — LIDOCAINE HYDROCHLORIDE 60 MG: 20 INJECTION, SOLUTION EPIDURAL; INFILTRATION; INTRACAUDAL; PERINEURAL at 10:59

## 2018-05-15 RX ADMIN — DEXAMETHASONE SODIUM PHOSPHATE 8 MG: 4 INJECTION, SOLUTION INTRA-ARTICULAR; INTRALESIONAL; INTRAMUSCULAR; INTRAVENOUS; SOFT TISSUE at 11:03

## 2018-05-15 RX ADMIN — ONDANSETRON 4 MG: 2 INJECTION INTRAMUSCULAR; INTRAVENOUS at 11:06

## 2018-05-15 RX ADMIN — FENTANYL CITRATE 100 MCG: 50 INJECTION, SOLUTION INTRAMUSCULAR; INTRAVENOUS at 10:59

## 2018-05-15 RX ADMIN — PROPOFOL 150 MG: 10 INJECTION, EMULSION INTRAVENOUS at 10:59

## 2018-05-15 RX ADMIN — FENTANYL CITRATE 50 MCG: 50 INJECTION, SOLUTION INTRAMUSCULAR; INTRAVENOUS at 11:21

## 2018-05-15 RX ADMIN — HYDROMORPHONE HYDROCHLORIDE 0.25 MG: 2 INJECTION INTRAMUSCULAR; INTRAVENOUS; SUBCUTANEOUS at 12:40

## 2018-05-15 RX ADMIN — MIDAZOLAM HYDROCHLORIDE 2 MG: 1 INJECTION, SOLUTION INTRAMUSCULAR; INTRAVENOUS at 10:52

## 2018-05-15 NOTE — PROGRESS NOTES
POST ANESTHESIA CARE    DISCHARGE / TRANSFER NOTE    Halie Mckeon was   discharged     via    wheel chair     to    private vehicle    . Patient was escorted by  nurse    . Patient verbalized   appreciation and was very pleased with care received   throughout their stay. Patient was discharged in     pleasant mood   . Pain at discharge/transfer was    1 /10. Some dizziness and nausea but improved with QueasEase and lying still. Discharge, medication and follow-up instructions were verbalized as understood prior to discharge  (if applicable for same-day procedures being discharged.)    All personal belongings have been returned to patient, and patient/family verbally confirm receiving belongings as all present.     Signed: [facility] BSN RN-BC

## 2018-05-15 NOTE — IP AVS SNAPSHOT
[address] 
[phone] Patient: Emory Mccall MRN: [id number] About your hospitalization You were admitted on:  May 15, 2018 You last received care in the:  [facility] PAC[facility] You were discharged on:  May 15, 2018 Why you were hospitalized Your primary diagnosis was:  Not on File Follow-up Information Follow up With Details Comments Contact Info Kong Carrillo, MD   Patient can only remember the practice name and not the physician Your Scheduled Appointments Friday June 08, 2018 11:55 AM EDT  
POST OP with Andrew Persaud MD  
[address] [facility] [address] [address] [address] [place] [address]  
[phone] Discharge Orders None A check karlos indicates which time of day the medication should be taken. My Medications START taking these medications Instructions Each Dose to Equal  
 Morning Noon Evening Bedtime HYDROcodone-acetaminophen 7.5-325 mg per tablet Commonly known as:  Zoila Brady Notes to Patient:  Take as needed for post-operative pain according to the directions on the label. Take 1 Tab by mouth every four (4) hours as needed for Pain. Max Daily Amount: 6 Tabs. 1 Tab CONTINUE taking these medications Instructions Each Dose to Equal  
 Morning Noon Evening Bedtime ADVIL 200 mg tablet Generic drug:  ibuprofen Your last dose was:  4/15/2018 Notes to Patient:  Take as needed according to package instructions. Take  by mouth every six (6) hours as needed for Pain. Where to Get Your Medications Information on where to get these meds will be given to you by the nurse or doctor. ! Ask your nurse or doctor about these medications HYDROcodone-acetaminophen 7.5-325 mg per tablet Opioid Education Prescription Opioids: What You Need to Know: 
 
Prescription opioids can be used to help relieve moderate-to-severe pain and are often prescribed following a surgery or injury, or for certain health conditions. These medications can be an important part of treatment but also come with serious risks. Opioids are strong pain medicines. Examples include hydrocodone, oxycodone, fentanyl, and morphine. Heroin is an example of an illegal opioid. It is important to work with your health care provider to make sure you are getting the safest, most effective care. WHAT ARE THE RISKS AND SIDE EFFECTS OF OPIOID USE? Prescription opioids carry serious risks of addiction and overdose, especially with prolonged use. An opioid overdose, often marked by slow breathing, can cause sudden death. The use of prescription opioids can have a number of side effects as well, even when taken as directed. · Tolerance-meaning you might need to take more of a medication for the same pain relief · Physical dependence-meaning you have symptoms of withdrawal when the medication is stopped. Withdrawal symptoms can include nausea, sweating, chills, diarrhea, stomach cramps, and muscle aches. Withdrawal can last up to several weeks, depending on which drug you took and how long you took it. · Increased sensitivity to pain · Constipation · Nausea, vomiting, and dry mouth · Sleepiness and dizziness · Confusion · Depression · Low levels of testosterone that can result in lower sex drive, energy, and strength · Itching and sweating RISKS ARE GREATER WITH:      
· History of drug misuse, substance use disorder, or overdose · Mental health conditions (such as depression or anxiety) · Sleep apnea · Older age (72 years or older) · Pregnancy Avoid alcohol while taking prescription opioids. Also, unless specifically advised by your health care provider, medications to avoid include: · Benzodiazepines (such as Xanax or Valium) · Muscle relaxants (such as Soma or Flexeril) · Hypnotics (such as Ambien or Lunesta) · Other prescription opioids KNOW YOUR OPTIONS Talk to your health care provider about ways to manage your pain that don't involve prescription opioids. Some of these options may actually work better and have fewer risks and side effects. Options may include: 
· Pain relievers such as acetaminophen, ibuprofen, and naproxen · Some medications that are also used for depression or seizures · Physical therapy and exercise · Counseling to help patients learn how to cope better with triggers of pain and stress. · Application of heat or cold compress · Massage therapy · Relaxation techniques Be Informed Make sure you know the name of your medication, how much and how often to take it, and its potential risks & side effects. IF YOU ARE PRESCRIBED OPIOIDS FOR PAIN: 
· Never take opioids in greater amounts or more often than prescribed. Remember the goal is not to be pain-free but to manage your pain at a tolerable level. · Follow up with your primary care provider to: · Work together to create a plan on how to manage your pain. · Talk about ways to help manage your pain that don't involve prescription opioids. · Talk about any and all concerns and side effects. · Help prevent misuse and abuse. · Never sell or share prescription opioids · Help prevent misuse and abuse. · Store prescription opioids in a secure place and out of reach of others (this may include visitors, children, friends, and family). · Safely dispose of unused/unwanted prescription opioids: Find your community drug take-back program or your pharmacy mail-back program, or flush them down the toilet, following guidance from the Food and Drug Administration (www.fda.gov/Drugs/ResourcesForYou). · Visit www.cdc.gov/drugoverdose to learn about the risks of opioid abuse and overdose. · If you believe you may be struggling with addiction, tell your health care provider and ask for guidance or call Leeann Montoya at 3-048-390-QTDH. Discharge Instructions Discharge Instructions from Dr. Randall Rashid · I will call you with the pathology results, typically within 1 week from today. · You may shower, but no hot tubs, swimming pools, or baths until your incision is healed. · No heavy lifting with the affected extremity (nothing greater than 5 pounds), and limit its use for the next 4-5 days. · You may use an ice pack for comfort for the next couple of days, but do not place ice directly on the skin. Rather, use a towel or clothing to serve as a barrier between skin and ice to prevent injury. · If I placed a drain, follow the drain instructions provided, especially as you keep a record of the drain output. · Follow medication instructions carefully. · Watch for signs of infection as listed below. · Redness · Swelling · Drainage from the incision or from your nipple that appears infected · Fever over 101 degrees for consecutive readings, or over 99.5 if you are currently undergoing chemotherapy. · Call our office (number is below) for a follow-up appointment. · If you have any problems, our phone number is [phone]. How to Care for Your Wound After Its Treated With DERMABOND®  Topical Skin Adhesive DERMABOND® Topical Skin Adhesive (2-octyl cyanoacrylate) is a sterile, liquid skin adhesive that holds wound edges together. The film will usually remain in place for 5-10 days, then naturally fall off your skin. The following will answer some of your questions and provide instructions for proper care for your wound while it is healing: CHECK THE WOUND APPEARANCE 
?  Some swelling, redness, and pain are common with all wounds and normally will go away as the wound heals. If swelling, redness, or pain increase or if the wound feels warm to the touch, contact your doctor. Also contact your doctor if the wound edges reopen or separate. REPLACE BANDAGES 
? If your wound is bandaged, keep the bandage dry. ? Replace the bandage daily until the adhesive film has fallen off of if the bandage should become wet, unless otherwise instructed by your doctor. ? When changing the dressing, do not place tape directly over the DERMABOND adhesive film, because removing the tape later may also remove the film. AVOIDING TOPICAL MEDICATIONS ? Do not apply liquid or ointment medications or any other product to your wound while the DERMABOND adhesive film is in place. These may loosen the film before your wound is healed. KEEP WOUND DRY AND PROTECTED ? You may occasionally and briefly wet your wound in the shower if permitted by your surgeon. Do not soak or scrub your wound, do not swim, and avoid periods of heavy perspiration until the DERMABOND has naturally fallen off. After showering, gently blot your wound dry with a soft towel. If a protective dressing is being used, apply a fresh, dry bandage, being sure to keep the tape off the DERMABOND adhesive film. ? Apply a clean, dry bandage over the wound if necessary to protect it. ? Protect your wound from injury until the skin has had sufficient time to heal 
? Do not scratch, rub, or pick at the DERMABOND adhesive film. This may loosen the film before your wound is healed. ? Protect the wound from prolonged exposure to sunlight or tanning lamps while the film is in place. If you have any questions or concerns about this product, please consult your doctor. ® DERMABOND is a registered [address] [place] [address] 800 [facility] 2002 DISCHARGE SUMMARY from your Nurse PATIENT INSTRUCTIONS After general anesthesia or intravenous sedation, for 24 hours or while taking prescription Narcotics: · Limit your activities · Do not drive and operate hazardous machinery · Do not make important personal or business decisions · Do  not drink alcoholic beverages · If you have not urinated within 8 hours after discharge, please contact your surgeon on call. Report the following to your surgeon: 
· Excessive pain, swelling, redness or odor of or around the surgical area · Temperature over 100.5 · Nausea and vomiting lasting longer than 4 hours or if unable to take medications · Any signs of decreased circulation or nerve impairment to extremity: change in color, persistent  numbness, tingling, coldness or increase pain · Any questions [address] Breathing deeply and coughing are very important exercises to do after surgery. Deep breathing and coughing open the little air tubes and air sacks in your lungs. You take deep breaths every day. You may not even notice - it is just something you do when you sigh or yawn. It is a natural exercise you do to keep these air passages open. After surgery, take deep breaths and cough, on purpose. DIRECTIONS: 
· Take 10 to 15 slow deep breaths every hour while awake. · Breathe in deeply, and hold it for 2 seconds. · Exhale slowly through puckered lips, like blowing up a balloon. · After every 4th or 5th deep breath, hug your pillow to your chest or belly and give a hard, deep cough. Yes, it will probably hurt. But doing this exercise is a very important part of healing after surgery. Take your pain medicine to help you do this exercise without too much pain. Coughing and deep breathing help prevent bronchitis and pneumonia after surgery. If you had chest or belly surgery, use a pillow as a \"hug buddy\" and hold it tightly to your chest or belly when you cough. ANKLE PUMPS Ankle pumps increase the circulation of oxygenated blood to your lower extremities and decrease your risk for circulation problems such as blood clots. They also stretch the muscles, tendons and ligaments in your foot and ankle, and prevent joint contracture in the ankle and foot, especially after surgeries on the legs. It is important to do ankle pump exercises regularly after surgery because immobility increases your risk for developing a blood clot. Your doctor may also have you take an Aspirin for the next few days as well. If your doctor did not ask you to take an Aspirin, consult with him before starting Aspirin therapy on your own. The exercise is quite simple. · Slowly point your foot forward, feeling the muscles on the top of your lower leg stretch, and hold this position for 5 seconds. · Next, pull your foot back toward you as far as possible, stretching the calf muscles, and hold that position for 5 seconds. · Repeat with the other foot. · Perform 10 repetitions every hour while awake for both ankles if possible (down and then up with the foot once is one repetition). You should feel gentle stretching of the muscles in your lower leg when doing this exercise. If you feel pain, or your range of motion is limited, don't push too hard. Only go the limit your joint and muscles will let you go. If you have increasing pain, progressively worsening leg warmth or swelling, STOP the exercise and call your doctor. MEDICATION AND  
SIDE EFFECT GUIDE [facility] EFFECT GUIDE was provided to the PATIENT AND CARE PROVIDER. Information provided includes instruction about drug purpose and common side effects for the following medications:  
· HYDROCODONE/ACETAMINOPHEN These are general instructions for a healthy lifestyle: *   Please give a list of your current medications to your Primary Care Provider.  
*   Please update this list whenever your medications are discontinued, doses are changed, or new medications (including over-the-counter products) are added. *   Please carry medication information at all times in case of emergency situations. About Smoking No smoking / No tobacco products Avoid exposure to second hand smoke Surgeon General's Warning:  Quitting smoking now greatly reduces serious risk to your health. Obesity, smoking, and sedentary lifestyle greatly increases your risk for illness and disease. A healthy diet, regular physical exercise & weight monitoring are important for maintaining a healthy lifestyle. Congestive Heart Failure You may be retaining fluid if you have a history of heart failure or if you experience any of the following symptoms:  Weight gain of 3 pounds or more overnight or 5 pounds in a week, increased swelling in your hands or feet or shortness of breath while lying flat in bed. Please call your doctor as soon as you notice any of these symptoms; do not wait until your next office visit. Recognize signs and symptoms of STROKE: 
F -  Face looks uneven A -  Arms unable to move or move evenly S -  Speech slurred or non-existent T -  Time-call 911 as soon as signs and symptoms begin-DO NOT go  
       back to bed or wait to see if you get better-TIME IS BRAIN. Warning Signs of HEART ATTACK Call 911 if you have these symptoms: 
 
? Chest discomfort. Most heart attacks involve discomfort in the center of the chest that lasts more than a few minutes, or that goes away and comes back. It can feel like uncomfortable pressure, squeezing, fullness, or pain. ? Discomfort in other areas of the upper body. Symptoms can include pain or discomfort in one or both arms, the back, neck, jaw, or stomach. ? Shortness of breath with or without chest discomfort. ? Other signs may include breaking out in a cold sweat, nausea, or lightheadedness. Don't wait more than five minutes to call [address]!  Fast action can save your life. Calling 911 is almost always the fastest way to get lifesaving treatment. Emergency Medical Services staff can begin treatment when they arrive  up to an hour sooner than if someone gets to the hospital by car. The discharge information has been reviewed with the patient and caregiver. Any questions and concerns from the patient and caregiver have been addressed. The patient and caregiver verbalized understanding. Other information in your discharge envelope: 
[x]     PRESCRIPTIONS []     PHYSICAL THERAPY PRESCRIPTION 
[]     APPOINTMENT CARDS []     Regional Anesthesia Pamphlet for block or block with On-Q Catheter from   Anesthesia Service []     Medical device information sheets/pamphlets from their   
[]     School/work excuse note. []     /parent work excuse note. The following personal items collected during your admission are returned to you:  
Dental Appliance: Dental Appliances: None Vision: Visual Aid: Glasses, With patient Hearing Aid:   
Jewelry: Jewelry: Verlinda Henle Clothing: Clothing: Footwear, Pants, Shirt, Undergarments, With patient Other Valuables: Other Valuables: Faheem Cline, Cell Phone (given to humairasumit) Valuables sent to safe:   
 
 
  
  
  
Introducing [facility]! [facility] introduces Lastline patient portal. Now you can access parts of your medical record, email your doctor's office, and request medication refills online. 1. In your internet browser, go to [email or web address] 2. Click on the First Time User? Click Here link in the Sign In box. You will see the New Member Sign Up page. 3. Enter your Lastline Access Code exactly as it appears below. You will not need to use this code after youve completed the sign-up process. If you do not sign up before the expiration date, you must request a new code. · Lastline Access Code: [email or web address] Expires: 7/26/2018  1:22 PM 
 
 4. Enter the last four digits of your Social Security Number (xxxx) and Date of Birth (mm/dd/yyyy) as indicated and click Submit. You will be taken to the next sign-up page. 5. Create a RockYou ID. This will be your RockYou login ID and cannot be changed, so think of one that is secure and easy to remember. 6. Create a RockYou password. You can change your password at any time. 7. Enter your Password Reset Question and Answer. This can be used at a later time if you forget your password. 8. Enter your e-mail address. You will receive e-mail notification when new information is available in [address] 9. Click Sign Up. You can now view and download portions of your medical record. 10. Click the Download Summary menu link to download a portable copy of your medical information. If you have questions, please visit the Frequently Asked Questions section of the RockYou website. Remember, RockYou is NOT to be used for urgent needs. For medical emergencies, dial 911. Now available from your iPhone and Android! Introducing Sumit Parsons As a [facility] patient, I wanted to make you aware of our electronic visit tool called Sumit AvilasliceX. [facility] 24/7 allows you to connect within minutes with a medical provider 24 hours a day, seven days a week via a mobile device or tablet or logging into a secure website from your computer. You can access Sumit Arnoldsamreenfin from anywhere in the [place]. A virtual visit might be right for you when you have a simple condition and feel like you just dont want to get out of bed, or cant get away from work for an appointment, when your regular [facility] provider is not available (evenings, weekends or holidays), or when youre out of town and need minor care.   Electronic visits cost only $49 and if the Sumit Parsons provider determines a prescription is needed to treat your condition, one can be electronically transmitted to a nearby pharmacy*. Please take a moment to enroll today if you have not already done so. The enrollment process is free and takes just a few minutes. To enroll, please download the [email or web address] 24/7 jordy to your tablet or phone, or visit [email or web address] to enroll on your computer. And, as an [address] [place] [address] patient with a [employer] account, the results of your visits will be scanned into your electronic medical record and your primary care provider will be able to view the scanned results. We urge you to continue to see your regular [email or web address] provider for your ongoing medical care. And while your primary care provider may not be the one available when you seek a [facility]ll virtual visit, the peace of mind you get from getting a real diagnosis real time can be priceless. For more information on Zachary Prell, view our Frequently Asked Questions (FAQs) at [email or web address]. Sincerely, 
 
Irasema Pop MD 
Chief Medical Officer [address] [place] [address] *:  certain medications cannot be prescribed via [facility]ll Providers Seen During Your Hospitalization Provider Specialty Primary office phone Chaya Ayala MD Breast Surgery [phone] Your Primary Care Physician (PCP) Primary Care Physician Office Phone Office Fax OTHER, PHYS ** None ** ** None ** You are allergic to the following Allergen Reactions Other Food Other (comments) Eggs and mayonaise cause mucus build up in throat Recent Documentation Height Weight BMI OB Status Smoking Status 1.676 m 61.2 kg 21.78 kg/m2 Perimenopausal Never Smoker Emergency Contacts Name Discharge Info Relation Home Work Mobile [address] CAREGIVER [3] Son [22] [id number] Patient Belongings The following personal items are in your possession at time of discharge: 
  Dental Appliances: None  Visual Aid: Glasses, With patient      Home Medications: None   Jewelry: Necklace  Clothing: Footwear, Pants, Shirt, Undergarments, With patient    Other Valuables: Telma Iglesias, Cell Phone (given to elly) Please provide this summary of care documentation to your next provider. Signatures-by signing, you are acknowledging that this After Visit Summary has been reviewed with you and you have received a copy. Patient Signature:  ____________________________________________________________ Date:  ____________________________________________________________  
  
[facility] Provider Signature:  ____________________________________________________________ Date:  ____________________________________________________________

## 2018-05-15 NOTE — DISCHARGE INSTRUCTIONS
Discharge Instructions from Dr. Alvarez Mejia    · I will call you with the pathology results, typically within 1 week from today. · You may shower, but no hot tubs, swimming pools, or baths until your incision is healed. · No heavy lifting with the affected extremity (nothing greater than 5 pounds), and limit its use for the next 4-5 days. · You may use an ice pack for comfort for the next couple of days, but do not place ice directly on the skin. Rather, use a towel or clothing to serve as a barrier between skin and ice to prevent injury. · If I placed a drain, follow the drain instructions provided, especially as you keep a record of the drain output. · Follow medication instructions carefully. · Watch for signs of infection as listed below. · Redness  · Swelling  · Drainage from the incision or from your nipple that appears infected  · Fever over 101 degrees for consecutive readings, or over 99.5 if you are currently undergoing chemotherapy. · Call our office (number is below) for a follow-up appointment. · If you have any problems, our phone number is [phone]. How to Care for Your Wound After Its Treated With  [facility]  Topical Skin Adhesive    DERMABOND® Topical Skin Adhesive (2-octyl cyanoacrylate) is a sterile, liquid skin adhesive that holds wound edges together. The film will usually remain in place for 5-10 days, then naturally fall off your skin. The following will answer some of your questions and provide instructions for proper care for your wound while it is healing:    CHECK THE WOUND APPEARANCE   Some swelling, redness, and pain are common with all wounds and normally will go away as the wound heals. If swelling, redness, or pain increase or if the wound feels warm to the touch, contact your doctor. Also contact your doctor if the wound edges reopen or separate.     REPLACE BANDAGES   If your wound is bandaged, keep the bandage dry.   Replace the bandage daily until the adhesive film has fallen off of if the bandage should become wet, unless otherwise instructed by your doctor.  When changing the dressing, do not place tape directly over the DERMABOND adhesive film, because removing the tape later may also remove the film. AVOIDING TOPICAL MEDICATIONS   Do not apply liquid or ointment medications or any other product to your wound while the DERMABOND adhesive film is in place. These may loosen the film before your wound is healed. KEEP WOUND DRY AND PROTECTED   You may occasionally and briefly wet your wound in the shower if permitted by your surgeon. Do not soak or scrub your wound, do not swim, and avoid periods of heavy perspiration until the DERMABOND has naturally fallen off. After showering, gently blot your wound dry with a soft towel. If a protective dressing is being used, apply a fresh, dry bandage, being sure to keep the tape off the DERMABOND adhesive film.  Apply a clean, dry bandage over the wound if necessary to protect it.  Protect your wound from injury until the skin has had sufficient time to heal   Do not scratch, rub, or pick at the DERMABOND adhesive film. This may loosen the film before your wound is healed.  Protect the wound from prolonged exposure to sunlight or tanning lamps while the film is in place. If you have any questions or concerns about this product, please consult your doctor.     ® DERMABOND is a registered [address] [place] [address]. 2002                DISCHARGE SUMMARY from your Nurse      PATIENT INSTRUCTIONS    After general anesthesia or intravenous sedation, for 24 hours or while taking prescription Narcotics:  · Limit your activities  · Do not drive and operate hazardous machinery  · Do not make important personal or business decisions  · Do  not drink alcoholic beverages  · If you have not urinated within 8 hours after discharge, please contact your surgeon on call. Report the following to your surgeon:  · Excessive pain, swelling, redness or odor of or around the surgical area  · Temperature over 100.5  · Nausea and vomiting lasting longer than 4 hours or if unable to take medications  · Any signs of decreased circulation or nerve impairment to extremity: change in color, persistent  numbness, tingling, coldness or increase pain  · Any questions      COUGH AND DEEP BREATHE    Breathing deeply and coughing are very important exercises to do after surgery. Deep breathing and coughing open the little air tubes and air sacks in your lungs. You take deep breaths every day. You may not even notice - it is just something you do when you sigh or yawn. It is a natural exercise you do to keep these air passages open. After surgery, take deep breaths and cough, on purpose. DIRECTIONS:  · Take 10 to 15 slow deep breaths every hour while awake. · Breathe in deeply, and hold it for 2 seconds. · Exhale slowly through puckered lips, like blowing up a balloon. · After every 4th or 5th deep breath, hug your pillow to your chest or belly and give a hard, deep cough. Yes, it will probably hurt. But doing this exercise is a very important part of healing after surgery. Take your pain medicine to help you do this exercise without too much pain. Coughing and deep breathing help prevent bronchitis and pneumonia after surgery. If you had chest or belly surgery, use a pillow as a \"hug buddy\" and hold it tightly to your chest or belly when you cough. ANKLE PUMPS    Ankle pumps increase the circulation of oxygenated blood to your lower extremities and decrease your risk for circulation problems such as blood clots. They also stretch the muscles, tendons and ligaments in your foot and ankle, and prevent joint contracture in the ankle and foot, especially after surgeries on the legs.     It is important to do ankle pump exercises regularly after surgery because immobility increases your risk for developing a blood clot. Your doctor may also have you take an Aspirin for the next few days as well. If your doctor did not ask you to take an Aspirin, consult with him before starting Aspirin therapy on your own. The exercise is quite simple. · Slowly point your foot forward, feeling the muscles on the top of your lower leg stretch, and hold this position for 5 seconds. · Next, pull your foot back toward you as far as possible, stretching the calf muscles, and hold that position for 5 seconds. · Repeat with the other foot. · Perform 10 repetitions every hour while awake for both ankles if possible (down and then up with the foot once is one repetition). You should feel gentle stretching of the muscles in your lower leg when doing this exercise. If you feel pain, or your range of motion is limited, don't push too hard. Only go the limit your joint and muscles will let you go. If you have increasing pain, progressively worsening leg warmth or swelling, STOP the exercise and call your doctor. MEDICATION AND   SIDE EFFECT GUIDE    The [place] MEDICATION AND SIDE EFFECT GUIDE was provided to the PATIENT AND CARE PROVIDER. Information provided includes instruction about drug purpose and common side effects for the following medications:   · HYDROCODONE/ACETAMINOPHEN        These are general instructions for a healthy lifestyle:    *   Please give a list of your current medications to your Primary Care Provider. *   Please update this list whenever your medications are discontinued, doses are changed, or new medications (including over-the-counter products) are added. *   Please carry medication information at all times in case of emergency situations.       About Smoking  No smoking / No tobacco products  Avoid exposure to second hand smoke     Surgeon General's Warning:  Quitting smoking now greatly reduces serious risk to your health. Obesity, smoking, and sedentary lifestyle greatly increases your risk for illness and disease. A healthy diet, regular physical exercise & weight monitoring are important for maintaining a healthy lifestyle. Congestive Heart Failure  You may be retaining fluid if you have a history of heart failure or if you experience any of the following symptoms:  Weight gain of 3 pounds or more overnight or 5 pounds in a week, increased swelling in your hands or feet or shortness of breath while lying flat in bed. Please call your doctor as soon as you notice any of these symptoms; do not wait until your next office visit. Recognize signs and symptoms of STROKE:  F -  Face looks uneven  A -  Arms unable to move or move evenly  S -  Speech slurred or non-existent  T -  Time-call 911 as soon as signs and symptoms begin-DO NOT go          back to bed or wait to see if you get better-TIME IS BRAIN. Warning Signs of HEART ATTACK   Call 911 if you have these symptoms:     Chest discomfort. Most heart attacks involve discomfort in the center of the chest that lasts more than a few minutes, or that goes away and comes back. It can feel like uncomfortable pressure, squeezing, fullness, or pain.  Discomfort in other areas of the upper body. Symptoms can include pain or discomfort in one or both arms, the back, neck, jaw, or stomach.  Shortness of breath with or without chest discomfort.  Other signs may include breaking out in a cold sweat, nausea, or lightheadedness. Don't wait more than five minutes to call 911 - MINUTES MATTER! Fast action can save your life. Calling 911 is almost always the fastest way to get lifesaving treatment. Emergency Medical Services staff can begin treatment when they arrive -- up to an hour sooner than if someone gets to the hospital by car. The discharge information has been reviewed with the patient and caregiver.     Any questions and concerns from the patient and caregiver have been addressed. The patient and caregiver verbalized understanding. Other information in your discharge envelope:  [x]     PRESCRIPTIONS  []     PHYSICAL THERAPY PRESCRIPTION  []     APPOINTMENT CARDS  []     Regional Anesthesia Pamphlet for block or block with On-Q Catheter from   Anesthesia Service  []     Medical device information sheets/pamphlets from their    []     School/work excuse note. []     /parent work excuse note. The following personal items collected during your admission are returned to you:   Dental Appliance: Dental Appliances: None  Vision: Visual Aid: Glasses, With patient  Hearing Aid:    Jewelry: Jewelry: Necklace  Clothing: Clothing: Footwear, Pants, Shirt, Undergarments, With patient  Other Valuables:  Other Valuables: Amrik Vaughan, Cell Phone (given to elly)  Valuables sent to safe:

## 2018-05-15 NOTE — IP AVS SNAPSHOT
Summary of Care Report The Summary of Care report has been created to help improve care coordination. Users with access to [employer] or [address] (Web-based application) may access additional patient information including the Discharge Summary. If you are not currently a [address] user and need more information, please call the number listed below in the [place] [address] section and ask to be connected with Medical Records. Facility Information Name Address Phone [address] [place] [id number] [phone] Patient Information Patient Name Sex MABEL Bingham ([id number]) Female 1965 Discharge Information Admitting Provider Service Area Unit Tonya Pfeiffer MD / [id number] / [phone] Discharge Provider Discharge Date/Time Discharge Disposition Destination (none) 5/15/2018 (Pending) [facility] (none) Patient Language Language ENGLISH [13] Hospital Problems as of 5/15/2018  Reviewed: 2018  9:53 AM by Tonya Pfeiffer MD  
 None Non-Hospital Problems as of 5/15/2018  Reviewed: 2018  9:53 AM by Tonya Pfeiffer MD  
  
  
  
 Class Noted - Resolved Last Modified Active Problems Ductal carcinoma in situ (DCIS) of right breast  2018 - Present 2018 by Yu Merino Entered by Yu Merino You are allergic to the following Allergen Reactions Other Food Other (comments) Eggs and mayonaise cause mucus build up in throat Current Discharge Medication List  
  
START taking these medications Dose & Instructions Dispensing Information Comments HYDROcodone-acetaminophen 7.5-325 mg per tablet Commonly known as:  Ashwin French Notes to Patient:  Take as needed for post-operative pain according to the directions on the label. Dose:  1 Tab Take 1 Tab by mouth every four (4) hours as needed for Pain. Max Daily Amount: 6 Tabs. Quantity:  25 Tab Refills:  0 CONTINUE these medications which have NOT CHANGED Dose & Instructions Dispensing Information Comments ADVIL 200 mg tablet Generic drug:  ibuprofen Notes to Patient:  Take as needed according to package instructions. Take  by mouth every six (6) hours as needed for Pain. Refills:  0 Surgery Information ID Date/Time Status Primary Surgeon All Procedures Location [id number] 5/15/2018 [address] [place] [address] Avenue., MD RIGHT BREAST LUMPECTOMY WITH NEEDLE LOCALIZATION [facility] AMBULATORY OR Follow-up Information Follow up With Details Comments Contact Info Kong Carrillo MD   Patient can only remember the practice name and not the physician Discharge Instructions Discharge Instructions from Dr. Ching Mendoza · I will call you with the pathology results, typically within 1 week from today. · You may shower, but no hot tubs, swimming pools, or baths until your incision is healed. · No heavy lifting with the affected extremity (nothing greater than 5 pounds), and limit its use for the next 4-5 days. · You may use an ice pack for comfort for the next couple of days, but do not place ice directly on the skin. Rather, use a towel or clothing to serve as a barrier between skin and ice to prevent injury. · If I placed a drain, follow the drain instructions provided, especially as you keep a record of the drain output. · Follow medication instructions carefully. · Watch for signs of infection as listed below. · Redness · Swelling · Drainage from the incision or from your nipple that appears infected · Fever over 101 degrees for consecutive readings, or over 99.5 if you are currently undergoing chemotherapy. · Call our office (number is below) for a follow-up appointment. · If you have any problems, our phone number is [phone]. How to Care for Your Wound After Its Treated With DERMABOND®  Topical Skin Adhesive DERMABOND® Topical Skin Adhesive (2-octyl cyanoacrylate) is a sterile, liquid skin adhesive that holds wound edges together. The film will usually remain in place for 5-10 days, then naturally fall off your skin. The following will answer some of your questions and provide instructions for proper care for your wound while it is healing: CHECK THE WOUND APPEARANCE 
? Some swelling, redness, and pain are common with all wounds and normally will go away as the wound heals. If swelling, redness, or pain increase or if the wound feels warm to the touch, contact your doctor. Also contact your doctor if the wound edges reopen or separate. REPLACE BANDAGES 
? If your wound is bandaged, keep the bandage dry. ? Replace the bandage daily until the adhesive film has fallen off of if the bandage should become wet, unless otherwise instructed by your doctor. ? When changing the dressing, do not place tape directly over the DERMABOND adhesive film, because removing the tape later may also remove the film. AVOIDING TOPICAL MEDICATIONS ? Do not apply liquid or ointment medications or any other product to your wound while the DERMABOND adhesive film is in place. These may loosen the film before your wound is healed. KEEP WOUND DRY AND PROTECTED ? You may occasionally and briefly wet your wound in the shower if permitted by your surgeon. Do not soak or scrub your wound, do not swim, and avoid periods of heavy perspiration until the DERMABOND has naturally fallen off. After showering, gently blot your wound dry with a soft towel. If a protective dressing is being used, apply a fresh, dry bandage, being sure to keep the tape off the DERMABOND adhesive film. ? Apply a clean, dry bandage over the wound if necessary to protect it. ? Protect your wound from injury until the skin has had sufficient time to heal 
? Do not scratch, rub, or pick at the DERMABOND adhesive film. This may loosen the film before your wound is healed. ? Protect the wound from prolonged exposure to sunlight or tanning lamps while the film is in place. If you have any questions or concerns about this product, please consult your doctor. ® DERMABOND is a registered [address] [place] [address] 2002 DISCHARGE SUMMARY from your Nurse PATIENT INSTRUCTIONS After general anesthesia or intravenous sedation, for 24 hours or while taking prescription Narcotics: · Limit your activities · Do not drive and operate hazardous machinery · Do not make important personal or business decisions · Do  not drink alcoholic beverages · If you have not urinated within 8 hours after discharge, please contact your surgeon on call. Report the following to your surgeon: 
· Excessive pain, swelling, redness or odor of or around the surgical area · Temperature over 100.5 · Nausea and vomiting lasting longer than 4 hours or if unable to take medications · Any signs of decreased circulation or nerve impairment to extremity: change in color, persistent  numbness, tingling, coldness or increase pain · Any questions [address] Breathing deeply and coughing are very important exercises to do after surgery. Deep breathing and coughing open the little air tubes and air sacks in your lungs. You take deep breaths every day. You may not even notice - it is just something you do when you sigh or yawn. It is a natural exercise you do to keep these air passages open. After surgery, take deep breaths and cough, on purpose. DIRECTIONS: 
· Take 10 to 15 slow deep breaths every hour while awake. · Breathe in deeply, and hold it for 2 seconds. · Exhale slowly through puckered lips, like blowing up a balloon. · After every 4th or 5th deep breath, hug your pillow to your chest or belly and give a hard, deep cough. Yes, it will probably hurt. But doing this exercise is a very important part of healing after surgery. Take your pain medicine to help you do this exercise without too much pain. Coughing and deep breathing help prevent bronchitis and pneumonia after surgery. If you had chest or belly surgery, use a pillow as a \"hug francesco\" and hold it tightly to your chest or belly when you cough. ANKLE PUMPS Ankle pumps increase the circulation of oxygenated blood to your lower extremities and decrease your risk for circulation problems such as blood clots. They also stretch the muscles, tendons and ligaments in your foot and ankle, and prevent joint contracture in the ankle and foot, especially after surgeries on the legs. It is important to do ankle pump exercises regularly after surgery because immobility increases your risk for developing a blood clot. Your doctor may also have you take an Aspirin for the next few days as well. If your doctor did not ask you to take an Aspirin, consult with him before starting Aspirin therapy on your own. The exercise is quite simple. · Slowly point your foot forward, feeling the muscles on the top of your lower leg stretch, and hold this position for 5 seconds. · Next, pull your foot back toward you as far as possible, stretching the calf muscles, and hold that position for 5 seconds. · Repeat with the other foot. · Perform 10 repetitions every hour while awake for both ankles if possible (down and then up with the foot once is one repetition). You should feel gentle stretching of the muscles in your lower leg when doing this exercise. If you feel pain, or your range of motion is limited, don't push too hard.   Only go the limit your joint and muscles will let you go. If you have increasing pain, progressively worsening leg warmth or swelling, STOP the exercise and call your doctor. MEDICATION AND  
SIDE EFFECT GUIDE The [address] [facility] EFFECT GUIDE was provided to the PATIENT AND CARE PROVIDER. Information provided includes instruction about drug purpose and common side effects for the following medications:  
· HYDROCODONE/ACETAMINOPHEN These are general instructions for a healthy lifestyle: *   Please give a list of your current medications to your Primary Care Provider. *   Please update this list whenever your medications are discontinued, doses are changed, or new medications (including over-the-counter products) are added. *   Please carry medication information at all times in case of emergency situations. About Smoking No smoking / No tobacco products Avoid exposure to second hand smoke Surgeon General's Warning:  Quitting smoking now greatly reduces serious risk to your health. Obesity, smoking, and sedentary lifestyle greatly increases your risk for illness and disease. A healthy diet, regular physical exercise & weight monitoring are important for maintaining a healthy lifestyle. Congestive Heart Failure You may be retaining fluid if you have a history of heart failure or if you experience any of the following symptoms:  Weight gain of 3 pounds or more overnight or 5 pounds in a week, increased swelling in your hands or feet or shortness of breath while lying flat in bed. Please call your doctor as soon as you notice any of these symptoms; do not wait until your next office visit. Recognize signs and symptoms of STROKE: 
F -  Face looks uneven A -  Arms unable to move or move evenly S -  Speech slurred or non-existent T -  Time-call 911 as soon as signs and symptoms begin-DO NOT go  
       back to bed or wait to see if you get better-TIME IS BRAIN. Warning Signs of HEART ATTACK Call 911 if you have these symptoms: 
 
? Chest discomfort. Most heart attacks involve discomfort in the center of the chest that lasts more than a few minutes, or that goes away and comes back. It can feel like uncomfortable pressure, squeezing, fullness, or pain. ? Discomfort in other areas of the upper body. Symptoms can include pain or discomfort in one or both arms, the back, neck, jaw, or stomach. ? Shortness of breath with or without chest discomfort. ? Other signs may include breaking out in a cold sweat, nausea, or lightheadedness. Don't wait more than five minutes to call [address]! Fast action can save your life. Calling 911 is almost always the fastest way to get lifesaving treatment. Emergency Medical Services staff can begin treatment when they arrive  up to an hour sooner than if someone gets to the hospital by car. The discharge information has been reviewed with the patient and caregiver. Any questions and concerns from the patient and caregiver have been addressed. The patient and caregiver verbalized understanding. Other information in your discharge envelope: 
[x]     PRESCRIPTIONS []     PHYSICAL THERAPY PRESCRIPTION 
[]     APPOINTMENT CARDS []     Regional Anesthesia Pamphlet for block or block with On-Q Catheter from   Anesthesia Service []     Medical device information sheets/pamphlets from their   
[]     School/work excuse note. []     /parent work excuse note. The following personal items collected during your admission are returned to you:  
Dental Appliance: Dental Appliances: None Vision: Visual Aid: Glasses, With patient Hearing Aid:   
Jewelry: Jewelry: Verlinda Henle Clothing: Clothing: Footwear, Pants, Shirt, Undergarments, With patient Other Valuables: Other Valuables: Faheem Cline Cell Phone (given to elly) Valuables sent to safe:   
 
 
Chart Review Routing History No Routing History on File

## 2018-05-15 NOTE — BRIEF OP NOTE
BRIEF OPERATIVE NOTE    Date of Procedure: 5/15/2018   Preoperative Diagnosis: DUCTAL CARCINOMA RIGHT BREAST  Postoperative Diagnosis: DUCTAL CARCINOMA RIGHT BREAST    Procedure(s):  RIGHT BREAST LUMPECTOMY WITH NEEDLE LOCALIZATION  Surgeon(s) and Role:     * Garret Burciaga MD - Primary         Surgical Assistant: Farzaneh    Surgical Staff:  Circ-1: Grecia Howe RN-1: Rebeca Kahn RN  Surg Asst-1: Socorro Kovacs RN  Event Time In   Incision Start 1118   Incision Close      Anesthesia: General   Estimated Blood Loss: minimal  Specimens:   ID Type Source Tests Collected by Time Destination   1 : Right breast lumpectomy Preservative Breast  Garret Burciaga MD 6/28/9049 1002 Pathology      Findings: spec radiograph pos clip   Complications: none  Implants:   Implant Name Type Inv.  Item Serial No.  Lot No. LRB No. Used Action   BioZorb LP      Sharalike [id number] Right 1 Implanted

## 2018-05-15 NOTE — H&P
HISTORY OF PRESENT ILLNESS  Cleo Ferro is a 50 y.o. female. HPI  NEW patient presents for consultation at the request of Dr. Kayden Pacheco for new diagnosis of RIGHT breast DCIS diagnosed after calcifications were seen on her recent mammogram.  Stereotactic biopsy was performed which revealed the DCIS. Donna Jean Baptiste has already had a breast MRI in [place]. She is not feeling any breast lumps, has no breast pain, no nipple discharge/retraction or skin change. She had calcifications biopsied in the LEFT breast in 2010, and these were benign.    There is no FH of breast or ovarian cancer. Recent imaging has been performed at [facility]          Past Medical History:   Diagnosis Date    Cancer [facility]) 2018     RIGHT breast DCIS         No past surgical history on file.     Social History            Social History    Marital status: UNKNOWN       Spouse name: N/A    Number of children: N/A    Years of education: N/A          Occupational History    Not on file.           Social History Main Topics    Smoking status: Never Smoker    Smokeless tobacco: Never Used    Alcohol use No    Drug use: No    Sexual activity: Not on file           Other Topics Concern    Not on file          Social History Narrative    No narrative on file         No current outpatient prescriptions on file prior to visit.      No current facility-administered medications on file prior to visit.          No Known Allergies     OB History     Obstetric Comments      Menarche:  13. LMP: June/July 2017. # of Children:  3. Age at Delivery of First Child:  34.   Hysterectomy/oophorectomy:  NO/NO. Breast Bx:  Yes, LEFT and RIGHT.   Hx of Breast Feeding:  No  BCP:  Yes, in the past. Hormone therapy: No                                   ROS  Constitutional: Negative.    HENT: Negative.    Eyes: Negative.    Respiratory: Negative.    Cardiovascular: Negative.    Gastrointestinal: Negative.    Genitourinary: Negative.    Musculoskeletal: Negative.    Skin: Negative.    Neurological: Negative.    Endo/Heme/Allergies: Negative.    Psychiatric/Behavioral: Negative.       Physical Exam   Cardiovascular: Normal rate, regular rhythm and normal heart sounds. Pulmonary/Chest: Breath sounds normal. Right breast exhibits no inverted nipple, no mass, no nipple discharge, no skin change and no tenderness. Left breast exhibits no inverted nipple, no mass, no nipple discharge, no skin change and no tenderness. Breasts are symmetrical.       Lymphadenopathy:        Right cervical: No superficial cervical, no deep cervical and no posterior cervical adenopathy present. Left cervical: No superficial cervical, no deep cervical and no posterior cervical adenopathy present. She has no axillary adenopathy. Right axillary: No pectoral and no lateral adenopathy present. Left axillary: No pectoral and no lateral adenopathy present.     BREAST ULTRASOUND, Preop planning  Indication:preop planning  right Breast upper outer quadrant   Technique: The area was scanned using a high-frequency linear-array near-field transducer  Findings: Sire barely visible  Impression: Breast cancer  Disposition: Lumpectomy with needle loc     ASSESSMENT and PLAN      ICD-10-CM ICD-9-CM     1. Ductal carcinoma in situ (DCIS) of right breast D05.11 233.0        Pt presents with new diagnosis of RIGHT breast DCIS. We reviewed that it is a small area of disease, and MRI appears normal. As UOQ site is barely visible on US today, I recommend lumpectomy with needle loc. A total of 60 minutes were spent face-to-face with the patient and her  during this encounter and over half of that time was spent on counseling and coordination of care. We discussed in depth the pathology results and need for surgery following MRI for extent of disease and surgical planning, as well as questions from the pt and her .  Reviewed that DCIS is a noninvasive form of breast cancer, but is a high-risk marker for development of invasive breast cancer. Goal of treatment is to reduce risk of recurrence within the breast.      Discussed  options with risks and complications of lumpectomy with needle loc and possible XRT, as well as mastectomy with reconstruction, both unilateral and bilateral with inherent benefits and limitations, both having the same cure rates. Discussed surgery plan to include incision above nipple around areola, and placement of BioZorb. This monique act as a 3D target for XRT, and will also promote better cosmetic results as a \"scaffolding\" for breast tissue. Discussed the use of DCISionRT for risk assessment of recurrence with and without XRT. If lumpectomy margins are positive, we will proceed with a secondary surgery for excision. We reviewed how margins are evaluated during surgery and in the pathology lab. We will f/u with results after surgery.     Discussed LNs - she does not need these checked as it is non-invasive cancer. If invasive cancer is demonstrated with PATH, we will check LNs in a second surgery.     We discussed adjuvant treatments including XRT, chemo, and hormonal therapy. If invasive breast cancer is found during surgery, we will proceed with XRT. If invasive cancer is not found, I will refer pt to radiation oncologist for consultation as to whether or not to proceed with XRT. At this time, chemo is not likely necessary. We will further review the need for hormonal therapy following surgery and PATH.     We had a thorough discussion of pt's questions. Dense breast tissue is troublesome in that it makes mammograms harder to read, but is only a minor risk factor for breast cancer. Because of density, we will need to make sure margins are clear, as it is sometimes harder to differentiate dense and cancerous tissues. Reviewed that \"high grade\" classification will be reevaluated following lumpectomy.  Pt has 2 sisters, and wonders whether BRCA testing is necessary. There is no other family hx of breast or ovarian cancer, and likelihood of gene mutation is low.     Pt would like to proceed quickly for her peace of mind. We will schedule lumpectomy in the near future. RIGHT mammo to follow 6 months after surgery. BL annual mammos to resume 1 year following surgery. This plan was reviewed with the patient and patient agrees. All questions were answered.

## 2018-05-15 NOTE — OP NOTES
[facility]  OPERATIVE REPORT    Eduar Bergeron  MR#: [id number]  : 1965  ACCOUNT #: [de-identified]   DATE OF SERVICE: 05/15/2018    PREOPERATIVE DIAGNOSIS:  Ductal carcinoma in situ of the right breast.    POSTOPERATIVE DIAGNOSIS:  Ductal carcinoma in situ of the right breast.    PROCEDURE:  Right lumpectomy with needle localization and placement of BioZorb bioprosthesis. SURGEON:  Olga Lidia Martin MD    ANESTHESIA:  General.    SPECIMENS REMOVED:  Right breast tissue. ESTIMATED BLOOD LOSS:  Minimal.    ASSISTANTS:  None applicable. IMPLANTS:  None applicable. COMPLICATIONS:  None. INDICATIONS:  The patient is a 65-year-old female with biopsy proven ductal carcinoma in situ of the right breast.  She is admitted for definitive surgical therapy. DESCRIPTION OF PROCEDURE:  After needle localization in radiology, the patient was brought to the operating room. After the satisfactory induction of general LMA anesthesia, she was prepped and draped in the usual sterile fashion. A circumareolar incision was made in the outer portion of the right nipple areolar complex and deepened through subcutaneous tissue to Bovie cautery. A skin flap was raised laterally to the wire insertion site which was brought into the wound. A standard lumpectomy was then performed down to chest wall in the upper outer quadrant including the entire wide portion and tip of the wire. The specimen was removed, it was oriented, and a specimen radiograph was obtained which revealed the presence of a clip within the center of the specimen with additional tissue medial to this to include all the microcalcifications. The specimen was sent to pathology. All dissection planes were hemostatic.   Small parenchymal flaps were created inferiorly and superiorly and a fiducial marker in the form of a 2 x 2 x 1 low profile BioZorb 3 dimensional bioprosthesis was placed and secured with interrupted 2-0 PDS sutures for radiologic identification of the site of the cancer for postop radiation therapy and to provide a scaffolding for scar tissue to facilitate a better cosmetic outcome. Parenchymal flaps that were created were closed over the BioSorb with interrupted 3-0 Vicryl sutures. The medial breast parenchyma was then sutured down over this filling in the lateral defect with interrupted 3-0 Vicryl sutures, the subcutaneous tissue was reapproximated with interrupted 3-0 Vicryl, and the skin was closed with a running subcuticular 4-0 Monocryl. The patient tolerated the procedure well with no immediate complications. The wound was anesthetized with 0.5% Marcaine, and she was taken to the recovery room in stable condition.       Ashwin An MD       JTRIXIE /   D: 05/15/2018 11:56     T: 05/15/2018 13:08  JOB #: [id number]  CC: Katelin Shane MD

## 2018-05-15 NOTE — ANESTHESIA POSTPROCEDURE EVALUATION
Post-Anesthesia Evaluation and Assessment    Patient: Fox Sullivan MRN: [id number]  SSN: [id number]    YOB: 1965  Age: 50 y.o. Sex: female       Cardiovascular Function/Vital Signs  Visit Vitals    /76    Pulse 82    Temp 36.7 °C (98.1 °F)    Resp 13    Ht 5' 6\" (1.676 m)    Wt 61.2 kg (134 lb 14.7 oz)    SpO2 100%    BMI 21.78 kg/m2       Patient is status post general anesthesia for Procedure(s):  RIGHT BREAST LUMPECTOMY WITH NEEDLE LOCALIZATION. Nausea/Vomiting: None    Postoperative hydration reviewed and adequate. Pain:  Pain Scale 1: Numeric (0 - 10) (05/15/18 1206)  Pain Intensity 1: 0 (05/15/18 1206)   Managed    Neurological Status:   Neuro (WDL): Exceptions to WDL (05/15/18 1206)  Neuro  Neurologic State: Drowsy (05/15/18 1206)  LUE Motor Response: Purposeful (05/15/18 1206)  LLE Motor Response: Purposeful (05/15/18 1206)  RUE Motor Response: Purposeful (05/15/18 1206)  RLE Motor Response: Purposeful (05/15/18 1206)   At baseline    Mental Status and Level of Consciousness: Arousable    Pulmonary Status:   O2 Device: Room air (05/15/18 1206)   Adequate oxygenation and airway patent    Complications related to anesthesia: None    Post-anesthesia assessment completed.  No concerns    Signed By: Kevin Smith MD     May 15, 2018

## 2018-05-18 ENCOUNTER — TELEPHONE (OUTPATIENT)
Dept: SURGERY | Age: 53
End: 2018-05-18

## 2018-05-21 ENCOUNTER — TELEPHONE (OUTPATIENT)
Dept: SURGERY | Age: 53
End: 2018-05-21

## 2018-05-21 NOTE — TELEPHONE ENCOUNTER
Patient called and left a message with the Hello answering service that she would like a nurse to call her back. She had surgery on Tuesday and has blood in her urine. I called patient back and there was no answer. Her voice mail is full and I was not able to leave her a message.

## 2018-05-22 ENCOUNTER — DOCUMENTATION ONLY (OUTPATIENT)
Dept: SURGERY | Age: 53
End: 2018-05-22

## 2018-05-22 NOTE — PROGRESS NOTES
Faxed TRF to [facility] to order DCISionRT per order of Dr. Ney Marsh. Insurance letter mailed to patient.

## 2018-06-08 ENCOUNTER — OFFICE VISIT (OUTPATIENT)
Dept: SURGERY | Age: 53
End: 2018-06-08

## 2018-06-08 ENCOUNTER — DOCUMENTATION ONLY (OUTPATIENT)
Dept: SURGERY | Age: 53
End: 2018-06-08

## 2018-06-08 VITALS
BODY MASS INDEX: 21.53 KG/M2 | DIASTOLIC BLOOD PRESSURE: 65 MMHG | HEART RATE: 74 BPM | SYSTOLIC BLOOD PRESSURE: 133 MMHG | WEIGHT: 134 LBS | HEIGHT: 66 IN

## 2018-06-08 DIAGNOSIS — D05.11 DUCTAL CARCINOMA IN SITU (DCIS) OF RIGHT BREAST: Primary | ICD-10-CM

## 2018-06-08 NOTE — PROGRESS NOTES
Type of Film: [x] CD [] FILMS  Type of Test: [x] MRI [x] MAMMO  From: [facility]  Given to: Patient to take with her  To be Downloaded into PACS:  YES    These have been downloaded into PACS already. Patient wanted to have disc back.

## 2018-06-08 NOTE — MR AVS SNAPSHOT
[address] [facility] [address] [place] [address] 
[phone] Patient: Emmy Saucedo MRN: [id number] Visit Information Date & Time Provider Department Dept. Phone Encounter #  
 6/8/2018 65:82 AM Caleb Knox MD [address] at [address] [facility] [id number] Upcoming Health Maintenance Date Due Pneumococcal 19-64 Highest Risk (1 of 3 - PCV13) 8/30/1986 DTaP/Tdap/Td series (1 - Tdap) 8/30/1988 PAP AKA CERVICAL CYTOLOGY 8/30/1988 FOBT Q 1 YEAR AGE 50-75 8/30/2017 MEDICARE YEARLY EXAM 4/30/2018 Influenza Age 5 to Adult 8/1/2018 BREAST CANCER SCRN MAMMOGRAM 3/30/2020 Allergies as of 6/8/2018  Review Complete On: 5/15/2018 By: Pilar Dang RN Severity Noted Reaction Type Reactions Other Food  05/01/2018    Other (comments) Eggs and mayonaise cause mucus build up in throat Current Immunizations  Never Reviewed No immunizations on file. Not reviewed this visit You Were Diagnosed With   
  
 Codes Comments Ductal carcinoma in situ (DCIS) of right breast    -  Primary ICD-10-CM: D05.11 ICD-9-CM: 233.0 Vitals BP Pulse Height(growth percentile) Weight(growth percentile) BMI OB Status 133/65 74 5' 6\" (1.676 m) 134 lb (60.8 kg) 21.63 kg/m2 Perimenopausal  
 Smoking Status Never Smoker Vitals History BMI and BSA Data Body Mass Index Body Surface Area  
 21.63 kg/m 2 1.68 m 2 Your Updated Medication List  
  
   
This list is accurate as of 6/8/18  1:20 PM.  Always use your most recent med list. ADVIL 200 mg tablet Generic drug:  ibuprofen Take  by mouth every six (6) hours as needed for Pain. HYDROcodone-acetaminophen 7.5-325 mg per tablet Commonly known as:  Day Cruz Take 1 Tab by mouth every four (4) hours as needed for Pain. Max Daily Amount: 6 Tabs. Patient Instructions Breast Cancer: Care Instructions Your Care Instructions Breast cancer occurs when abnormal cells grow out of control in the breast. These cancer cells can spread within the breast, to nearby lymph nodes and other tissues, and to other parts of the body. Being treated for cancer can weaken your body, and you may feel very tired. Get the rest your body needs so you can feel better. Finding out that you have cancer is scary. You may feel many emotions and may need some help coping. Seek out family, friends, and counselors for support. You also can do things at home to make yourself feel better while you go through treatment. Call the [employer] (7-571.325.9983) or visit its website at [address] for more information. Follow-up care is a key part of your treatment and safety. Be sure to make and go to all appointments, and call your doctor if you are having problems. It's also a good idea to know your test results and keep a list of the medicines you take. How can you care for yourself at home? · Take your medicines exactly as prescribed. Call your doctor if you think you are having a problem with your medicine. You may get medicine for nausea and vomiting if you have these side effects. · Follow your doctor's instructions to relieve pain. Pain from cancer and surgery can almost always be controlled. Use pain medicine when you first notice pain, before it becomes severe. · Eat healthy food. If you do not feel like eating, try to eat food that has protein and extra calories to keep up your strength and prevent weight loss. Drink liquid meal replacements for extra calories and protein. Try to eat your main meal early. · Get some physical activity every day, but do not get too tired. Keep doing the hobbies you enjoy as your energy allows. · Do not smoke. Smoking can make your cancer worse.  If you need help quitting, talk to your doctor about stop-smoking programs and medicines. These can increase your chances of quitting for good. · Take steps to control your stress and workload. Learn relaxation techniques. ¨ Share your feelings. Stress and tension affect our emotions. By expressing your feelings to others, you may be able to understand and cope with them. ¨ Consider joining a support group. Talking about a problem with your spouse, a good friend, or other people with similar problems is a good way to reduce tension and stress. ¨ Express yourself through art. Try writing, crafts, dance, or art to relieve stress. Some dance, writing, or art groups may be available just for people who have cancer. ¨ Be kind to your body and mind. Getting enough sleep, eating a healthy diet, and taking time to do things you enjoy can contribute to an overall feeling of balance in your life and can help reduce stress. ¨ Get help if you need it. Discuss your concerns with your doctor or counselor. · If you are vomiting or have diarrhea: ¨ Drink plenty of fluids (enough so that your urine is light yellow or clear like water) to prevent dehydration. Choose water and other caffeine-free clear liquids. If you have kidney, heart, or liver disease and have to limit fluids, talk with your doctor before you increase the amount of fluids you drink. ¨ When you are able to eat, try clear soups, mild foods, and liquids until all symptoms are gone for 12 to 48 hours. Other good choices include dry toast, crackers, cooked cereal, and gelatin dessert, such as Jell-O. · If you have not already done so, prepare a list of advance directives. Advance directives are instructions to your doctor and family members about what kind of care you want if you become unable to speak or express yourself. When should you call for help? Call 911 anytime you think you may need emergency care. For example, call if: 
? · You passed out (lost consciousness). ?Call your doctor now or seek immediate medical care if: 
? · You have a fever. ? · You have abnormal bleeding. ? · You think you have an infection. ? · You have new or worse pain. ? · You have new symptoms, such as a cough, belly pain, vomiting, diarrhea, or a rash. ? Watch closely for changes in your health, and be sure to contact your doctor if: 
? · You are much more tired than usual.  
? · You have swollen glands in your armpits, groin, or neck. ? · You do not get better as expected. Where can you learn more? Go to http://marguerite-naif.info/. Enter V321 in the search box to learn more about \"Breast Cancer: Care Instructions. \" Current as of: May 12, 2017 Content Version: 11.4 © 1786-0311 [employer]. Care instructions adapted under license by [employer] (which disclaims liability or warranty for this information). If you have questions about a medical condition or this instruction, always ask your healthcare professional. [address] any warranty or liability for your use of this information. Introducing [facility]! Dear Donnie: Thank you for requesting a [employer] account. Our records indicate that you already have an active [employer] account. You can access your account anytime at [email or web address] Did you know that you can access your hospital and ER discharge instructions at any time in [employer]? You can also review all of your test results from your hospital stay or ER visit. Additional Information If you have questions, please visit the Frequently Asked Questions section of the [employer] website at [email or web address] Remember, [employer] is NOT to be used for urgent needs. For medical emergencies, dial 911. Now available from your iPhone and Android! Please provide this summary of care documentation to your next provider. Your primary care clinician is listed as Phys Other. If you have any questions after today's visit, please call [phone].

## 2018-06-08 NOTE — PATIENT INSTRUCTIONS
Breast Cancer: Care Instructions  Your Care Instructions    Breast cancer occurs when abnormal cells grow out of control in the breast. These cancer cells can spread within the breast, to nearby lymph nodes and other tissues, and to other parts of the body. Being treated for cancer can weaken your body, and you may feel very tired. Get the rest your body needs so you can feel better. Finding out that you have cancer is scary. You may feel many emotions and may need some help coping. Seek out family, friends, and counselors for support. You also can do things at home to make yourself feel better while you go through treatment. Call the [employer] (6-469.238.9910) or visit its website at [employer] for more information. Follow-up care is a key part of your treatment and safety. Be sure to make and go to all appointments, and call your doctor if you are having problems. It's also a good idea to know your test results and keep a list of the medicines you take. How can you care for yourself at home? · Take your medicines exactly as prescribed. Call your doctor if you think you are having a problem with your medicine. You may get medicine for nausea and vomiting if you have these side effects. · Follow your doctor's instructions to relieve pain. Pain from cancer and surgery can almost always be controlled. Use pain medicine when you first notice pain, before it becomes severe. · Eat healthy food. If you do not feel like eating, try to eat food that has protein and extra calories to keep up your strength and prevent weight loss. Drink liquid meal replacements for extra calories and protein. Try to eat your main meal early. · Get some physical activity every day, but do not get too tired. Keep doing the hobbies you enjoy as your energy allows. · Do not smoke. Smoking can make your cancer worse. If you need help quitting, talk to your doctor about stop-smoking programs and medicines.  These can increase your chances of quitting for good. · Take steps to control your stress and workload. Learn relaxation techniques. ¨ Share your feelings. Stress and tension affect our emotions. By expressing your feelings to others, you may be able to understand and cope with them. ¨ Consider joining a support group. Talking about a problem with your spouse, a good friend, or other people with similar problems is a good way to reduce tension and stress. ¨ Express yourself through art. Try writing, crafts, dance, or art to relieve stress. Some dance, writing, or art groups may be available just for people who have cancer. ¨ Be kind to your body and mind. Getting enough sleep, eating a healthy diet, and taking time to do things you enjoy can contribute to an overall feeling of balance in your life and can help reduce stress. ¨ Get help if you need it. Discuss your concerns with your doctor or counselor. · If you are vomiting or have diarrhea:  ¨ Drink plenty of fluids (enough so that your urine is light yellow or clear like water) to prevent dehydration. Choose water and other caffeine-free clear liquids. If you have kidney, heart, or liver disease and have to limit fluids, talk with your doctor before you increase the amount of fluids you drink. ¨ When you are able to eat, try clear soups, mild foods, and liquids until all symptoms are gone for 12 to 48 hours. Other good choices include dry toast, crackers, cooked cereal, and gelatin dessert, such as Jell-O.  · If you have not already done so, prepare a list of advance directives. Advance directives are instructions to your doctor and family members about what kind of care you want if you become unable to speak or express yourself. When should you call for help? Call 911 anytime you think you may need emergency care. For example, call if:  ? · You passed out (lost consciousness). ?Call your doctor now or seek immediate medical care if:  ? · You have a fever. ? · You have abnormal bleeding. ? · You think you have an infection. ? · You have new or worse pain. ? · You have new symptoms, such as a cough, belly pain, vomiting, diarrhea, or a rash. ? Watch closely for changes in your health, and be sure to contact your doctor if:  ? · You are much more tired than usual.   ? · You have swollen glands in your armpits, groin, or neck. ? · You do not get better as expected. Where can you learn more? Go to http://marguerite-naif.info/. Enter V321 in the search box to learn more about \"Breast Cancer: Care Instructions. \"  Current as of: May 12, 2017  Content Version: 11.4  © 7991-6109 [employer]. Care instructions adapted under license by [employer] (which disclaims liability or warranty for this information). If you have questions about a medical condition or this instruction, always ask your healthcare professional. [address] any warranty or liability for your use of this information.

## 2018-06-08 NOTE — PROGRESS NOTES
HISTORY OF PRESENT ILLNESS  Telma Kennedy is a 50 y.o. female. HPI  ESTABLISHED patient here for post op follow up RIGHT breast cancer. She is doing well. Feeling better everyday. Her RIGHT nipple is sensitive to touch. Feels a hard area to area on the side of the breast.  Incisions dry and intact. No swelling or redness. 04/09/18: RIGHT breast stereo bx, 11:00. PATH: DCIS, nuclear score 3 of 3, insufficient cells for ER/NE interpretation. Clinical stage 0.     05/15/18: RIGHT breast lumpectomy with needle loc and placement of BioZorb. PATH: DCIS, 2.2cm, nuclear grade 3, clear margins, ER+(90%).  Pathologic staging: pTis, pNX    ROS    Physical Exam    ASSESSMENT and PLAN  {ASSESSMENT/PLAN:52379}

## 2018-06-08 NOTE — PROGRESS NOTES
HISTORY OF PRESENT ILLNESS  Kandy Winn is a 50 y.o. female. HPI  ESTABLISHED patient here for post op follow up RIGHT breast cancer. She is doing well. Feeling better everyday. Her RIGHT nipple is sensitive to touch. Feels a hard area to area on the side of the breast.  Incisions dry and intact. No swelling or redness.      04/09/18: RIGHT breast stereo bx, 11:00. PATH: DCIS, nuclear score 3 of 3, insufficient cells for ER/VT interpretation. Clinical stage 0.      05/15/18: RIGHT breast lumpectomy with needle loc and placement of BioZorb. PATH: DCIS, 2.2cm, nuclear grade 3, clear margins, ER+(90%). Pathologic staging: pTis, pNX    Past Medical History:   Diagnosis Date    Adverse effect of anesthesia     hypotensive with morphine and needed to be hospitalized    Anemia     Bell palsy     Cancer ([facility] [address] 2018    RIGHT breast DCIS    Vasovagal reaction        Past Surgical History:   Procedure Laterality Date    HX BREAST LUMPECTOMY Right 5/15/2018    RIGHT BREAST LUMPECTOMY WITH NEEDLE LOCALIZATION performed by Tana Cervantes MD at [facility]       Social History     Social History    Marital status: SINGLE     Spouse name: N/A    Number of children: N/A    Years of education: N/A     Occupational History    Not on file. Social History Main Topics    Smoking status: Never Smoker    Smokeless tobacco: Never Used    Alcohol use No    Drug use: No    Sexual activity: Not on file     Other Topics Concern    Not on file     Social History Narrative       Current Outpatient Prescriptions on File Prior to Visit   Medication Sig Dispense Refill    HYDROcodone-acetaminophen (NORCO) 7.5-325 mg per tablet Take 1 Tab by mouth every four (4) hours as needed for Pain. Max Daily Amount: 6 Tabs. 25 Tab 0    ibuprofen (ADVIL) 200 mg tablet Take  by mouth every six (6) hours as needed for Pain. No current facility-administered medications on file prior to visit.         Allergies Allergen Reactions    Other Food Other (comments)     Eggs and mayonaise cause mucus build up in throat       OB History     Obstetric Comments     Menarche:  13. LMP: June/July 2017. # of Children:  3. Age at Delivery of First Child:  34.   Hysterectomy/oophorectomy:  NO/NO. Breast Bx:  Yes, LEFT and RIGHT. Hx of Breast Feeding:  No  BCP:  Yes, in the past. Hormone therapy: No                          ROS    Physical Exam   Cardiovascular: Normal rate, regular rhythm and normal heart sounds. Pulmonary/Chest: Breath sounds normal. Right breast exhibits no inverted nipple, no mass, no nipple discharge, no skin change and no tenderness. Left breast exhibits no inverted nipple, no mass, no nipple discharge, no skin change and no tenderness. Breasts are symmetrical.       Lymphadenopathy:        Right cervical: No superficial cervical, no deep cervical and no posterior cervical adenopathy present. Left cervical: No superficial cervical, no deep cervical and no posterior cervical adenopathy present. She has no axillary adenopathy. Right axillary: No pectoral and no lateral adenopathy present. Left axillary: No pectoral and no lateral adenopathy present. BREAST ULTRASOUND  Indication: Right  breast hardness  Technique: The area was scanned using a high-frequency linear-array near-field transducer  Findings: No abnormal mass, lesion, or shadowing noted. No cysts  Impression: Normal breast tissue, denseness from scar tissue  Disposition: No worrisome finding on ultrasound    ASSESSMENT and PLAN    ICD-10-CM ICD-9-CM    1. Ductal carcinoma in situ (DCIS) of right breast D05.11 233.0       Pt presents for f/u s/p RIGHT breast lumpectomy for DCIS on 05/15/18, and is doing well overall. Pt notes sensitivity and \"hard spot\" at RIGHT nipple, especially as the day goes on. She also notes swelling s/p surgery, which she says resolved within about 1.5 days.  Reviewed that this sensitivity is expected to resolve with time. \"Hard\" spot at nipple is scarring, with possible fluid. US performed, and visualizes dense breast tissue from scar tissue, but no fluid. Reviewed that Fleta Kennel was placed during lumpectomy. DCISionRT results came back low risk, suggesting pt would not benefit from radiation. Her risk for recurrence is approx. 9%, with less than 50% of this risk for invasive cancer. Will refer for consultation with radiation oncology for further consultation given 2.2cm tumor size, and because DCISionRT is a relatively new test. Reviewed that estrogen blocker may not be necessary, as it would only provide her with a 2% reduction in breast cancer risk, but add 1% increased risk of uterine cancer. F/U in 6 months with RIGHT diagnostic mammo. This plan was reviewed with the patient and patient agrees. All questions were answered.     Written by Jorge Ramsey, as dictated by Dr. Rosa Malin MD.

## 2018-06-11 DIAGNOSIS — D05.11 DUCTAL CARCINOMA IN SITU (DCIS) OF RIGHT BREAST: Primary | ICD-10-CM

## 2018-06-11 NOTE — COMMUNICATION BODY
HISTORY OF PRESENT ILLNESS  Kandy Winn is a 50 y.o. female. HPI  ESTABLISHED patient here for post op follow up RIGHT breast cancer. She is doing well. Feeling better everyday. Her RIGHT nipple is sensitive to touch. Feels a hard area to area on the side of the breast.  Incisions dry and intact. No swelling or redness.      04/09/18: RIGHT breast stereo bx, 11:00. PATH: DCIS, nuclear score 3 of 3, insufficient cells for ER/OR interpretation. Clinical stage 0.      05/15/18: RIGHT breast lumpectomy with needle loc and placement of BioZorb. PATH: DCIS, 2.2cm, nuclear grade 3, clear margins, ER+(90%). Pathologic staging: pTis, pNX    Past Medical History:   Diagnosis Date    Adverse effect of anesthesia     hypotensive with morphine and needed to be hospitalized    Anemia     Bell palsy     Cancer ([facility] [address] 2018    RIGHT breast DCIS    Vasovagal reaction        Past Surgical History:   Procedure Laterality Date    HX BREAST LUMPECTOMY Right 5/15/2018    RIGHT BREAST LUMPECTOMY WITH NEEDLE LOCALIZATION performed by Tana Cervantes MD at [facility]       Social History     Social History    Marital status: SINGLE     Spouse name: N/A    Number of children: N/A    Years of education: N/A     Occupational History    Not on file. Social History Main Topics    Smoking status: Never Smoker    Smokeless tobacco: Never Used    Alcohol use No    Drug use: No    Sexual activity: Not on file     Other Topics Concern    Not on file     Social History Narrative       Current Outpatient Prescriptions on File Prior to Visit   Medication Sig Dispense Refill    HYDROcodone-acetaminophen (NORCO) 7.5-325 mg per tablet Take 1 Tab by mouth every four (4) hours as needed for Pain. Max Daily Amount: 6 Tabs. 25 Tab 0    ibuprofen (ADVIL) 200 mg tablet Take  by mouth every six (6) hours as needed for Pain. No current facility-administered medications on file prior to visit.         Allergies Allergen Reactions    Other Food Other (comments)     Eggs and mayonaise cause mucus build up in throat       OB History     Obstetric Comments     Menarche:  13. LMP: June/July 2017. # of Children:  3. Age at Delivery of First Child:  34.   Hysterectomy/oophorectomy:  NO/NO. Breast Bx:  Yes, LEFT and RIGHT. Hx of Breast Feeding:  No  BCP:  Yes, in the past. Hormone therapy: No                          ROS    Physical Exam   Cardiovascular: Normal rate, regular rhythm and normal heart sounds. Pulmonary/Chest: Breath sounds normal. Right breast exhibits no inverted nipple, no mass, no nipple discharge, no skin change and no tenderness. Left breast exhibits no inverted nipple, no mass, no nipple discharge, no skin change and no tenderness. Breasts are symmetrical.       Lymphadenopathy:        Right cervical: No superficial cervical, no deep cervical and no posterior cervical adenopathy present. Left cervical: No superficial cervical, no deep cervical and no posterior cervical adenopathy present. She has no axillary adenopathy. Right axillary: No pectoral and no lateral adenopathy present. Left axillary: No pectoral and no lateral adenopathy present. BREAST ULTRASOUND  Indication: Right  breast hardness  Technique: The area was scanned using a high-frequency linear-array near-field transducer  Findings: No abnormal mass, lesion, or shadowing noted. No cysts  Impression: Normal breast tissue, denseness from scar tissue  Disposition: No worrisome finding on ultrasound    ASSESSMENT and PLAN    ICD-10-CM ICD-9-CM    1. Ductal carcinoma in situ (DCIS) of right breast D05.11 233.0       Pt presents for f/u s/p RIGHT breast lumpectomy for DCIS on 05/15/18, and is doing well overall. Pt notes sensitivity and \"hard spot\" at RIGHT nipple, especially as the day goes on. She also notes swelling s/p surgery, which she says resolved within about 1.5 days.  Reviewed that this sensitivity is expected to resolve with time. \"Hard\" spot at nipple is scarring, with possible fluid. US performed, and visualizes dense breast tissue from scar tissue, but no fluid. Reviewed that Nikole San Isidro was placed during lumpectomy. DCISionRT results came back low risk, suggesting pt would not benefit from radiation. Her risk for recurrence is approx. 9%, with less than 50% of this risk for invasive cancer. Will refer for consultation with radiation oncology for further consultation given 2.2cm tumor size, and because DCISionRT is a relatively new test. Reviewed that estrogen blocker may not be necessary, as it would only provide her with a 2% reduction in breast cancer risk, but add 1% increased risk of uterine cancer. F/U in 6 months with RIGHT diagnostic mammo. This plan was reviewed with the patient and patient agrees. All questions were answered.     Written by Vipul Berry, as dictated by Dr. Delano Morley MD.

## 2018-06-22 ENCOUNTER — HOSPITAL ENCOUNTER (OUTPATIENT)
Dept: RADIATION THERAPY | Age: 53
Discharge: HOME | End: 2018-06-22

## 2018-11-28 ENCOUNTER — TELEPHONE (OUTPATIENT)
Dept: SURGERY | Age: 53
End: 2018-11-28

## 2018-11-28 ENCOUNTER — DOCUMENTATION ONLY (OUTPATIENT)
Dept: SURGERY | Age: 53
End: 2018-11-28

## 2018-11-28 DIAGNOSIS — Z98.890 S/P LUMPECTOMY, RIGHT BREAST: ICD-10-CM

## 2018-11-28 DIAGNOSIS — D05.11 BREAST NEOPLASM, TIS (DCIS), RIGHT: Primary | ICD-10-CM

## 2018-11-28 NOTE — TELEPHONE ENCOUNTER
Returned patient's call. She needs an order for her mammogram and needs a follow-up with our office. She prefers to go to the imaging center in [facility] for the mammogram, and I will fax the order for the RIGHT diagnostic mammogram to them at [phone]. I transferred her to one of our PSRs to make a follow-up appointment. She will see Mandie Miller NP on 12/11/18.

## 2018-11-28 NOTE — PROGRESS NOTES
Faxed order for RIGHT diagnostic mammogram to [address] [place] [address] for [facility] in [facility] at [phone], confirmation received.

## 2018-11-29 ENCOUNTER — APPOINTMENT (OUTPATIENT)
Age: 53
Setting detail: DERMATOLOGY
End: 2018-12-07

## 2018-11-29 DIAGNOSIS — Z71.89 OTHER SPECIFIED COUNSELING: ICD-10-CM

## 2018-11-29 DIAGNOSIS — L81.4 OTHER MELANIN HYPERPIGMENTATION: ICD-10-CM

## 2018-11-29 PROBLEM — D23.9 OTHER BENIGN NEOPLASM OF SKIN, UNSPECIFIED: Status: ACTIVE | Noted: 2018-11-29

## 2018-11-29 PROCEDURE — 99203 OFFICE O/P NEW LOW 30 MIN: CPT

## 2018-11-29 PROCEDURE — OTHER COUNSELING: OTHER

## 2018-11-29 PROCEDURE — OTHER MIPS QUALITY: OTHER

## 2018-11-29 ASSESSMENT — LOCATION DETAILED DESCRIPTION DERM
LOCATION DETAILED: RIGHT RADIAL DORSAL HAND
LOCATION DETAILED: LEFT DISTAL DORSAL FOREARM
LOCATION DETAILED: LEFT VENTRAL DISTAL FOREARM
LOCATION DETAILED: RIGHT VENTRAL DISTAL FOREARM
LOCATION DETAILED: SUPERIOR THORACIC SPINE
LOCATION DETAILED: RIGHT DORSAL WRIST
LOCATION DETAILED: LEFT RADIAL DORSAL HAND

## 2018-11-29 ASSESSMENT — LOCATION ZONE DERM
LOCATION ZONE: HAND
LOCATION ZONE: ARM
LOCATION ZONE: TRUNK

## 2018-11-29 ASSESSMENT — LOCATION SIMPLE DESCRIPTION DERM
LOCATION SIMPLE: LEFT HAND
LOCATION SIMPLE: RIGHT WRIST
LOCATION SIMPLE: RIGHT FOREARM
LOCATION SIMPLE: RIGHT HAND
LOCATION SIMPLE: UPPER BACK
LOCATION SIMPLE: LEFT FOREARM

## 2018-11-29 NOTE — PROCEDURE: MIPS QUALITY
Quality 130: Documentation Of Current Medications In The Medical Record: Current Medications Documented
Detail Level: Detailed
Quality 431: Preventive Care And Screening: Unhealthy Alcohol Use - Screening: Patient screened for unhealthy alcohol use using a single question and scores less than 2 times per year
Quality 226: Preventive Care And Screening: Tobacco Use: Screening And Cessation Intervention: Patient screened for tobacco use and is an ex/non-smoker

## 2018-12-18 ENCOUNTER — OFFICE VISIT (OUTPATIENT)
Dept: SURGERY | Age: 53
End: 2018-12-18

## 2018-12-18 VITALS
DIASTOLIC BLOOD PRESSURE: 60 MMHG | BODY MASS INDEX: 20.89 KG/M2 | HEART RATE: 67 BPM | SYSTOLIC BLOOD PRESSURE: 112 MMHG | WEIGHT: 130 LBS | HEIGHT: 66 IN

## 2018-12-18 DIAGNOSIS — Z98.890 S/P LUMPECTOMY, RIGHT BREAST: ICD-10-CM

## 2018-12-18 DIAGNOSIS — Z92.3 S/P RADIATION THERAPY: ICD-10-CM

## 2018-12-18 DIAGNOSIS — D05.11 DUCTAL CARCINOMA IN SITU (DCIS) OF RIGHT BREAST: Primary | ICD-10-CM

## 2018-12-18 NOTE — PROGRESS NOTES
HISTORY OF PRESENT ILLNESS  Janak Handley is a 48 y.o. female. HPI  ESTABLISHED patient here for 6 month follow up RIGHT breast cancer. Has pain to her neck that can be really bad some days. Has pain to her breast but feels that radiation has caused that. Denies any new lumps. Always has lumpy RIGHT breast.      Breast history -   04/09/18: RIGHT breast stereo, 11:00. PATH: DCIS, nuclear score 3 of 3, insufficient cells for ER/IL interpretation. Clinical stage 0.  05/15/18: RIGHT breast lumpectomy with needle loc and placement of BioZorb. PATH: DCIS, 2.2cm, nuclear grade 3, clear margins, ER+(90%). Pathologic staging: pTis, pNX  DCISRT - low risk  XRT - completed 8/6/18 - Dr. Mely Stark hormonal therapy    OB History     Obstetric Comments     Menarche:  8. LMP: June/July 2017. # of Children:  3. Age at Delivery of First Child:  34.   Hysterectomy/oophorectomy:  NO/NO. Breast Bx:  Yes, LEFT and RIGHT. Hx of Breast Feeding:  No  BCP:  Yes, in the past. Hormone therapy: No                          Past Surgical History:   Procedure Laterality Date    HX BREAST LUMPECTOMY Right 5/15/2018    RIGHT BREAST LUMPECTOMY WITH NEEDLE LOCALIZATION performed by Felipa Marr MD at [address] [place] [address]     There is no FH of breast or ovarian cancer. Mammogram RIGHT Dx, [facility], 12/3/18, BIRADS 2  ROS    Physical Exam   Constitutional: She appears well-developed and well-nourished. Pulmonary/Chest: Right breast exhibits no inverted nipple, no mass, no nipple discharge, no skin change (well healed surgical incision around NAC; mild skin darkening and thickening s/p xrt) and no tenderness. Left breast exhibits no inverted nipple, no mass, no nipple discharge, no skin change and no tenderness. Breasts are symmetrical.   Musculoskeletal: Normal range of motion. UE x 2   Lymphadenopathy:     She has no cervical adenopathy. She has no axillary adenopathy.         Right: No supraclavicular adenopathy present. Left: No supraclavicular adenopathy present. Skin: Skin is warm, dry and intact. Chest and breasts examined   Psychiatric: She has a normal mood and affect. Her speech is normal and behavior is normal.     Visit Vitals  /60   Pulse 67   Ht 5' 6\" (1.676 m)   Wt 130 lb (59 kg)   BMI 20.98 kg/m²     ASSESSMENT and PLAN  Encounter Diagnoses   Name Primary?  Ductal carcinoma in situ (DCIS) of right breast Yes    S/P lumpectomy, right breast     S/P radiation therapy      Normal exam and imaging with no evidence of local recurrence. Discussed post treatment changes to the breast including skin changes, chest wall pain and nerve regeneration. Neck pain is along shoulder and back of her neck. Likely musculoskeletal related. Will follow-up with PCP. Has follow-up with rad onc. BDmammogram in 3/2019 and RTC here in 6 months or sooner PRN. She is comfortable with this plan. All questions answered and she stated understanding.

## 2019-06-25 ENCOUNTER — OFFICE VISIT (OUTPATIENT)
Dept: SURGERY | Age: 54
End: 2019-06-25

## 2019-06-25 VITALS
DIASTOLIC BLOOD PRESSURE: 71 MMHG | HEART RATE: 62 BPM | SYSTOLIC BLOOD PRESSURE: 115 MMHG | HEIGHT: 66 IN | WEIGHT: 134 LBS | BODY MASS INDEX: 21.53 KG/M2

## 2019-06-25 DIAGNOSIS — Z98.890 S/P LUMPECTOMY, RIGHT BREAST: ICD-10-CM

## 2019-06-25 DIAGNOSIS — Z92.3 S/P RADIATION THERAPY: ICD-10-CM

## 2019-06-25 DIAGNOSIS — D05.11 DUCTAL CARCINOMA IN SITU (DCIS) OF RIGHT BREAST: Primary | ICD-10-CM

## 2019-06-25 NOTE — PATIENT INSTRUCTIONS

## 2019-06-25 NOTE — PROGRESS NOTES
HISTORY OF PRESENT ILLNESS  Lon Murray is a 48 y.o. female. HPI   ESTABLISHED patient here for 6 month follow up RIGHT breast cancer. Has a burning vice type pain that comes and goes to her RIGHT breast. Denies any breast lumps. Breast history -   Refer - Dr. Alee Brewster  04/09/18: RIGHT breast stereo, 11:00. PATH: DCIS, nuclear score 3 of 3, insufficient cells for ER/IN interpretation. Clinical stage 0.  05/15/18: RIGHT breast lumpectomy with needle loc and placement of BioZorb. PATH: DCIS, 2.2cm, nuclear grade 3, clear margins, UL+(70%) - Dr. Shashank Campbell  Pathologic staging: pTis, pNX  DCISRT - low risk  XRT - completed 8/6/18 - Dr. Emmanuel Melton hormonal therapy    OB History    None      Obstetric Comments    Menarche:  13. LMP: June/July 2017. # of Children:  3. Age at Delivery of First Child:  34.   Hysterectomy/oophorectomy:  NO/NO. Breast Bx:  Yes, LEFT and RIGHT. Hx of Breast Feeding:  No  BCP:  Yes, in the past. Hormone therapy: No           Past Surgical History:   Procedure Laterality Date    HX BREAST LUMPECTOMY Right 5/15/2018    RIGHT BREAST LUMPECTOMY WITH NEEDLE LOCALIZATION performed by Mark Clark MD at [address] [place] [address]     There is no FH of breast or ovarian cancer. Mammogram, 5/28/19, [facility], BIRADS 2  ROS    Physical Exam   Constitutional: She appears well-developed and well-nourished. Pulmonary/Chest: Right breast exhibits no inverted nipple, no mass, no nipple discharge, no skin change and no tenderness. Left breast exhibits no inverted nipple, no mass, no nipple discharge, no skin change and no tenderness. Breasts are symmetrical.   Musculoskeletal: Normal range of motion. UE x 2   Lymphadenopathy:     She has no axillary adenopathy. Right: No supraclavicular adenopathy present. Left: No supraclavicular adenopathy present. Skin: Skin is warm, dry and intact.    Chest and breasts examined   Psychiatric: She has a normal mood and affect. Her speech is normal and behavior is normal.     Visit Vitals  /71   Pulse 62   Ht 5' 6\" (1.676 m)   Wt 134 lb (60.8 kg)   BMI 21.63 kg/m²     ASSESSMENT and PLAN  Encounter Diagnoses   Name Primary?  Ductal carcinoma in situ (DCIS) of right breast Yes    S/P lumpectomy, right breast     S/P radiation therapy      Normal exam and imaging with no evidence of local recurrence. Reviewed normal sensations to the treated breast.  Reviewed s/sx of local recurrence. RTC in 6 months or sooner PRN. Anticipate BDmammogram in 5/2020 - to be done at [facility]. She is comfortable with this plan. All questions answered and she stated understanding.

## 2019-11-25 ENCOUNTER — TELEPHONE (OUTPATIENT)
Dept: SURGERY | Age: 54
End: 2019-11-25

## 2019-11-25 NOTE — TELEPHONE ENCOUNTER
Patient called and left  stating that at her last appointment with Josh Farias NP, she was told that she will get her next mammogram in 5/2020. She gets her mammograms done in [place] [address] and has been getting messages from them that she needs her next mammogram in December, 2019. She requested we leave her a message on her voicemail about this. She has an appointment with Lacie Montanez NP, on 1/7/19, so I left a message that she keep that appointment and discuss her next breast imaging with her at that time.

## 2020-01-21 ENCOUNTER — OFFICE VISIT (OUTPATIENT)
Dept: SURGERY | Age: 55
End: 2020-01-21

## 2020-01-21 VITALS
HEART RATE: 80 BPM | DIASTOLIC BLOOD PRESSURE: 58 MMHG | BODY MASS INDEX: 21.53 KG/M2 | WEIGHT: 134 LBS | HEIGHT: 66 IN | SYSTOLIC BLOOD PRESSURE: 96 MMHG

## 2020-01-21 DIAGNOSIS — Z92.3 S/P RADIATION THERAPY: ICD-10-CM

## 2020-01-21 DIAGNOSIS — Z85.3 HISTORY OF BREAST CANCER IN FEMALE: ICD-10-CM

## 2020-01-21 DIAGNOSIS — D05.11 DUCTAL CARCINOMA IN SITU (DCIS) OF RIGHT BREAST: Primary | ICD-10-CM

## 2020-01-21 DIAGNOSIS — Z98.890 S/P LUMPECTOMY, RIGHT BREAST: ICD-10-CM

## 2020-01-21 NOTE — PROGRESS NOTES
HISTORY OF PRESENT ILLNESS  Shana Cleveland is a 48 y.o. female. HPI   ESTABLISHED patient here for follow-up of RIGHT breast DCIS, S/P lumpectomy in 2018. She is doing well. Only complaint is of some occasional pain in the RIGHT breast at the lumpectomy site. Had a RIGHT mammogram in 11/2019 in [place], and this was normal.      Breast history -   Refer - Dr. Jes Salmon  04/09/18: RIGHT breast stereo, 11:00. PATH: DCIS, nuclear score 3 of 3, insufficient cells for ER/PA interpretation. Clinical stage 0.  05/15/18: RIGHT breast lumpectomy with needle loc and placement of BioZorb. PATH: DCIS, 2.2cm, nuclear grade 3, clear margins, LX+(23%) - Dr. Checo Lovett  Pathologic staging: pTis, pNX  DCISRT - low risk  XRT - completed 8/6/18 - Dr. Isabel Ch hormonal therapy  RIGHT diagnostic mammogram 11/29/19, BIRADS 3, probably benign. She will have a BILATERAL mammogram in May of 2020. There is no FH of breast or ovarian cancer. OB History    No obstetric history on file. Obstetric Comments    Menarche:  8. LMP: June/July 2017. # of Children:  3. Age at Delivery of First Child:  34.   Hysterectomy/oophorectomy:  NO/NO. Breast Bx:  Yes, LEFT and RIGHT. Hx of Breast Feeding:  No  BCP:  Yes, in the past. Hormone therapy: No           Past Surgical History:   Procedure Laterality Date    HX BREAST LUMPECTOMY Right 5/15/2018    RIGHT BREAST LUMPECTOMY WITH NEEDLE LOCALIZATION performed by Shira Avalos MD at [address] [place] [address]    Physical Exam  Constitutional:       Appearance: Normal appearance. Chest:      Breasts:         Right: Tenderness (right UOQ tender - post treatment changes) present. No mass, nipple discharge or skin change. Left: No mass, nipple discharge, skin change or tenderness. Musculoskeletal: Normal range of motion. Comments: UE x 2   Lymphadenopathy:      Upper Body:      Right upper body: No supraclavicular or axillary adenopathy. Left upper body: No supraclavicular or axillary adenopathy. Neurological:      Mental Status: She is alert. Psychiatric:         Attention and Perception: Attention normal.         Mood and Affect: Mood normal.         Speech: Speech normal.         Behavior: Behavior normal.       Visit Vitals  BP 96/58   Pulse 80   Ht 5' 6\" (1.676 m)   Wt 134 lb (60.8 kg)   LMP  (Within Years)   BMI 21.63 kg/m²     ASSESSMENT and PLAN  Encounter Diagnoses   Name Primary?  Ductal carcinoma in situ (DCIS) of right breast Yes    S/P lumpectomy, right breast     S/P radiation therapy     History of breast cancer in female      Normal exam and imaging with no evidence of local recurrence. Reviewed normal post treatment changes. BDmammogram 3D in 5/2020 - will have in [facility]. RTC in 6 months or sooner PRN. Anticipate yearly visits after that. She is comfortable with this plan. All questions answered and she stated understanding.

## 2020-03-10 ENCOUNTER — APPOINTMENT (OUTPATIENT)
Age: 55
Setting detail: DERMATOLOGY
End: 2020-03-11

## 2020-03-10 DIAGNOSIS — Z71.89 OTHER SPECIFIED COUNSELING: ICD-10-CM

## 2020-03-10 DIAGNOSIS — L81.4 OTHER MELANIN HYPERPIGMENTATION: ICD-10-CM

## 2020-03-10 DIAGNOSIS — L82.1 OTHER SEBORRHEIC KERATOSIS: ICD-10-CM

## 2020-03-10 DIAGNOSIS — L81.8 OTHER SPECIFIED DISORDERS OF PIGMENTATION: ICD-10-CM

## 2020-03-10 DIAGNOSIS — I83.9 ASYMPTOMATIC VARICOSE VEINS OF LOWER EXTREMITIES: ICD-10-CM

## 2020-03-10 DIAGNOSIS — D485 NEOPLASM OF UNCERTAIN BEHAVIOR OF SKIN: ICD-10-CM

## 2020-03-10 PROBLEM — I83.91 ASYMPTOMATIC VARICOSE VEINS OF RIGHT LOWER EXTREMITY: Status: ACTIVE | Noted: 2020-03-10

## 2020-03-10 PROBLEM — D48.5 NEOPLASM OF UNCERTAIN BEHAVIOR OF SKIN: Status: ACTIVE | Noted: 2020-03-10

## 2020-03-10 PROCEDURE — OTHER REASSURANCE: OTHER

## 2020-03-10 PROCEDURE — OTHER COUNSELING: OTHER

## 2020-03-10 PROCEDURE — OTHER ADDITIONAL NOTES: OTHER

## 2020-03-10 PROCEDURE — OTHER MIPS QUALITY: OTHER

## 2020-03-10 PROCEDURE — 99213 OFFICE O/P EST LOW 20 MIN: CPT

## 2020-03-10 PROCEDURE — OTHER DEFER: OTHER

## 2020-03-10 ASSESSMENT — LOCATION DETAILED DESCRIPTION DERM
LOCATION DETAILED: RIGHT INFERIOR LATERAL UPPER BACK
LOCATION DETAILED: RIGHT DORSAL WRIST
LOCATION DETAILED: LOWER STERNUM
LOCATION DETAILED: RIGHT SUPERIOR CENTRAL MALAR CHEEK
LOCATION DETAILED: RIGHT INFERIOR CENTRAL MALAR CHEEK
LOCATION DETAILED: LEFT DORSAL WRIST
LOCATION DETAILED: RIGHT KNEE

## 2020-03-10 ASSESSMENT — LOCATION ZONE DERM
LOCATION ZONE: LEG
LOCATION ZONE: TRUNK
LOCATION ZONE: FACE
LOCATION ZONE: ARM

## 2020-03-10 ASSESSMENT — LOCATION SIMPLE DESCRIPTION DERM
LOCATION SIMPLE: RIGHT WRIST
LOCATION SIMPLE: RIGHT CHEEK
LOCATION SIMPLE: RIGHT UPPER BACK
LOCATION SIMPLE: RIGHT KNEE
LOCATION SIMPLE: LEFT WRIST
LOCATION SIMPLE: CHEST

## 2020-03-10 NOTE — PROCEDURE: ADDITIONAL NOTES
Additional Notes: Patient deferred biopsy of lesion today. She states lesion has progressively gotten lighter over the past three months. Will see patient back in two months to re-evaluate lesion. Advised patient to call if she decides to do biopsy or notices lesion changing at all.
Detail Level: Simple

## 2020-06-15 ENCOUNTER — HOSPITAL ENCOUNTER (OUTPATIENT)
Dept: RADIATION THERAPY | Age: 55
Discharge: HOME | End: 2020-06-15

## 2020-08-24 ENCOUNTER — DOCUMENTATION ONLY (OUTPATIENT)
Dept: SURGERY | Age: 55
End: 2020-08-24

## 2020-08-25 ENCOUNTER — OFFICE VISIT (OUTPATIENT)
Dept: SURGERY | Age: 55
End: 2020-08-25
Payer: COMMERCIAL

## 2020-08-25 VITALS
HEIGHT: 66 IN | TEMPERATURE: 97 F | WEIGHT: 134 LBS | BODY MASS INDEX: 21.53 KG/M2 | HEART RATE: 79 BPM | DIASTOLIC BLOOD PRESSURE: 59 MMHG | SYSTOLIC BLOOD PRESSURE: 105 MMHG

## 2020-08-25 DIAGNOSIS — Z92.3 S/P RADIATION THERAPY: ICD-10-CM

## 2020-08-25 DIAGNOSIS — Z98.890 S/P LUMPECTOMY, RIGHT BREAST: ICD-10-CM

## 2020-08-25 DIAGNOSIS — Z85.3 HISTORY OF BREAST CANCER IN FEMALE: ICD-10-CM

## 2020-08-25 DIAGNOSIS — D05.11 DUCTAL CARCINOMA IN SITU (DCIS) OF RIGHT BREAST: Primary | ICD-10-CM

## 2020-08-25 PROCEDURE — 99213 OFFICE O/P EST LOW 20 MIN: CPT | Performed by: NURSE PRACTITIONER

## 2020-08-25 NOTE — PROGRESS NOTES
HISTORY OF PRESENT ILLNESS  Violeta Burns is a 48 y.o. female. HPI  ESTABLISHED patient here for follow-up of RIGHT breast DCIS, S/P lumpectomy in 2018. She is doing well. Denies breast mass, skin changes, nipple discharge and pain.       Breast history -   Refer - Dr. Supa Rivera  04/09/18: RIGHT breast stereo, 11:00. PATH: DCIS, nuclear score 3 of 3, insufficient cells for ER/PA interpretation. Clinical stage 0.  05/15/18: RIGHT breast lumpectomy with needle loc and placement of BioZorb. PATH: DCIS, 2.2cm, nuclear grade 3, clear margins, YR+(43%) - Dr. Elizabeth Carter  Pathologic staging: pTis, pNX  DCISRT - low risk  XRT - completed 8/6/18 - Dr. Opal Paz hormonal therapy      There is no FH of breast or ovarian cancer. 6/11/20 - Bilateral mammogram 3D - BIRADS 2    OB History    No obstetric history on file. Obstetric Comments    Menarche:  8. LMP: June/July 2017. # of Children:  3. Age at Delivery of First Child:  34.   Hysterectomy/oophorectomy:  NO/NO. Breast Bx:  Yes, LEFT and RIGHT. Hx of Breast Feeding:  No  BCP:  Yes, in the past. Hormone therapy: No           Past Surgical History:   Procedure Laterality Date    HX BREAST LUMPECTOMY Right 5/15/2018    RIGHT BREAST LUMPECTOMY WITH NEEDLE LOCALIZATION performed by Jona Davis MD at [address] [place] [address]    Physical Exam  Constitutional:       Appearance: Normal appearance. Chest:      Breasts:         Right: Tenderness present. No mass, nipple discharge or skin change. Left: No mass, nipple discharge, skin change or tenderness. Musculoskeletal: Normal range of motion. Comments: UE x 2   Lymphadenopathy:      Upper Body:      Right upper body: No supraclavicular or axillary adenopathy. Left upper body: No supraclavicular or axillary adenopathy. Neurological:      Mental Status: She is alert.    Psychiatric:         Attention and Perception: Attention normal.         Mood and Affect: Mood normal.         Speech: Speech normal.         Behavior: Behavior normal.       Visit Vitals  /59 (BP 1 Location: Right arm, BP Patient Position: Sitting)   Pulse 79   Temp 97 °F (36.1 °C) (Skin)   Ht 5' 6\" (1.676 m)   Wt 134 lb (60.8 kg)   LMP 07/01/2017   BMI 21.63 kg/m²       ASSESSMENT and PLAN  Encounter Diagnoses   Name Primary?  Ductal carcinoma in situ (DCIS) of right breast Yes    S/P lumpectomy, right breast     S/P radiation therapy     History of breast cancer in female       Normal exam and imaging with no evidence of local recurrence. Will monitor for any discrete masses in UOQ. Reviewed normal post treatment changes. Continues to see Dr. Joyice Krabbe. Some residual chest wall pain secondary to XRT, but is tolerable. BDmammogram 3D in 6/2021 - will have in [facility]. RTC in 1 year or sooner PRN. She is comfortable with this plan. All questions answered and she stated understanding.

## 2020-08-25 NOTE — PATIENT INSTRUCTIONS

## 2021-06-29 ENCOUNTER — HOSPITAL ENCOUNTER (OUTPATIENT)
Dept: RADIATION THERAPY | Age: 56
Discharge: HOME | End: 2021-06-29

## 2021-09-07 ENCOUNTER — OFFICE VISIT (OUTPATIENT)
Dept: SURGERY | Age: 56
End: 2021-09-07
Payer: COMMERCIAL

## 2021-09-07 VITALS
DIASTOLIC BLOOD PRESSURE: 60 MMHG | BODY MASS INDEX: 21.53 KG/M2 | SYSTOLIC BLOOD PRESSURE: 104 MMHG | WEIGHT: 134 LBS | HEART RATE: 77 BPM | HEIGHT: 66 IN

## 2021-09-07 DIAGNOSIS — D05.11 DUCTAL CARCINOMA IN SITU (DCIS) OF RIGHT BREAST: Primary | ICD-10-CM

## 2021-09-07 DIAGNOSIS — Z98.890 S/P LUMPECTOMY, RIGHT BREAST: ICD-10-CM

## 2021-09-07 DIAGNOSIS — Z85.3 HISTORY OF BREAST CANCER IN FEMALE: ICD-10-CM

## 2021-09-07 DIAGNOSIS — Z92.3 S/P RADIATION THERAPY: ICD-10-CM

## 2021-09-07 PROCEDURE — 99213 OFFICE O/P EST LOW 20 MIN: CPT | Performed by: NURSE PRACTITIONER

## 2021-09-07 NOTE — PROGRESS NOTES
HISTORY OF PRESENT ILLNESS  Vipul Villanueva is a 49 y.o. female. HPI ESTABLISHED patient here for follow-up of RIGHT breast DCIS, S/P lumpectomy in 2018.  She is doing well.  Denies breast mass, skin changes, nipple discharge and pain.       Breast history -   Refer - Dr. Rhonda Park  04/09/18: RIGHT breast stereo, 11:00. PATH: DCIS, nuclear score 3 of 3, insufficient cells for ER/[place] interpretation. Clinical stage 0.  05/15/18: RIGHT breast lumpectomy with needle loc and placement of BioZorb. PATH: DCIS, 2.2cm, nuclear grade 3, clear margins, FZ+(04%) - Dr. Sajan Hinkle  Pathologic staging: pTis, pNX  DCISRT - low risk  XRT - completed 8/6/18 - Dr. Janel Eid hormonal therapy      There is no FH of breast or ovarian cancer.     8/2/21 - Bilateral mammogram 3D - BIRADS 2        OB History    No obstetric history on file. Obstetric Comments    Menarche:  8. LMP: June/July 2017. # of Children:  3. Age at Delivery of First Child:  34.   Hysterectomy/oophorectomy:  NO/NO. Breast Bx:  Yes, LEFT and RIGHT. Hx of Breast Feeding:  No  BCP:  Yes, in the past. Hormone therapy: No               Past Surgical History:   Procedure Laterality Date    HX BREAST LUMPECTOMY Right 5/15/2018    RIGHT BREAST LUMPECTOMY WITH NEEDLE LOCALIZATION performed by Caleb Stock MD at [address] [place] [address]    Physical Exam  Constitutional:       Appearance: Normal appearance. Chest:      Breasts:         Right: No mass, nipple discharge, skin change or tenderness. Left: No mass, nipple discharge, skin change or tenderness. Musculoskeletal:      Comments: FROM - UE x 2   Lymphadenopathy:      Upper Body:      Right upper body: No supraclavicular or axillary adenopathy. Left upper body: No supraclavicular or axillary adenopathy. Neurological:      Mental Status: She is alert.    Psychiatric:         Attention and Perception: Attention normal.         Mood and Affect: Mood normal. Speech: Speech normal.         Behavior: Behavior normal.         Visit Vitals  /60 (BP 1 Location: Right arm, BP Patient Position: Sitting, BP Cuff Size: Adult)   Pulse 77   Ht 5' 6\" (1.676 m)   Wt 134 lb (60.8 kg)   LMP 07/01/2017   BMI 21.63 kg/m²         ASSESSMENT and PLAN  Encounter Diagnoses   Name Primary?  Ductal carcinoma in situ (DCIS) of right breast Yes    S/P lumpectomy, right breast     S/P radiation therapy     History of breast cancer in female         Normal exam and imaging with no evidence of local recurrence. Reviewed normal post treatment changes.    Continues to see Dr. Deborah Hodges. Some residual chest wall/rib pain secondary to XRT, but is tolerable. BDmammogram 3D in 8/2022 - will have in [place]. RTC in 1 year or sooner PRN. She is comfortable with this plan.  All questions answered and she stated understanding. Total time spent for this patient - 20 minutes.

## 2021-09-07 NOTE — PATIENT INSTRUCTIONS

## 2021-09-09 ENCOUNTER — HOSPITAL ENCOUNTER (OUTPATIENT)
Dept: RADIATION THERAPY | Age: 56
Discharge: HOME | End: 2021-09-09

## 2021-09-23 ENCOUNTER — HOSPITAL ENCOUNTER (OUTPATIENT)
Dept: RADIATION THERAPY | Age: 56
Discharge: HOME | End: 2021-09-23

## 2022-03-19 PROBLEM — D05.11 DUCTAL CARCINOMA IN SITU (DCIS) OF RIGHT BREAST: Status: ACTIVE | Noted: 2018-04-27

## 2022-04-13 ENCOUNTER — HOSPITAL ENCOUNTER (OUTPATIENT)
Dept: RADIATION THERAPY | Age: 57
Discharge: HOME | End: 2022-04-13

## 2022-09-01 DIAGNOSIS — Z85.3 HISTORY OF BREAST CANCER: Primary | ICD-10-CM

## 2022-09-22 DIAGNOSIS — Z85.3 HISTORY OF BREAST CANCER: ICD-10-CM

## 2022-09-22 DIAGNOSIS — N63.10 MASS OF RIGHT BREAST, UNSPECIFIED QUADRANT: Primary | ICD-10-CM

## 2022-09-27 ENCOUNTER — OFFICE VISIT (OUTPATIENT)
Dept: SURGERY | Age: 57
End: 2022-09-27
Payer: COMMERCIAL

## 2022-09-27 VITALS — WEIGHT: 134 LBS | HEIGHT: 66 IN | BODY MASS INDEX: 21.53 KG/M2

## 2022-09-27 DIAGNOSIS — Z92.3 S/P RADIATION THERAPY: ICD-10-CM

## 2022-09-27 DIAGNOSIS — Z85.3 HISTORY OF BREAST CANCER IN FEMALE: ICD-10-CM

## 2022-09-27 DIAGNOSIS — D05.11 DUCTAL CARCINOMA IN SITU (DCIS) OF RIGHT BREAST: Primary | ICD-10-CM

## 2022-09-27 DIAGNOSIS — Z98.890 S/P LUMPECTOMY, RIGHT BREAST: ICD-10-CM

## 2022-09-27 PROCEDURE — 99213 OFFICE O/P EST LOW 20 MIN: CPT | Performed by: NURSE PRACTITIONER

## 2022-09-27 NOTE — PROGRESS NOTES
HISTORY OF PRESENT ILLNESS  Alissa Meza is a 56 y.o. female. HPI ESTABLISHED patient here for follow-up of RIGHT breast DCIS, S/P lumpectomy in 2018. She is doing well. Denies breast mass, skin changes, nipple discharge and pain. Breast history -   Refer - Dr. Rohan Pappas  04/09/18: RIGHT breast stereo, 11:00. PATH: DCIS, nuclear score 3 of 3, insufficient cells for ER/PA interpretation. Clinical stage 0.  05/15/18: RIGHT breast lumpectomy with needle loc and placement of BioZorb. PATH: DCIS, 2.2cm, nuclear grade 3, clear margins, ZB+(75%) - Dr. Blossom Rodrigues  Pathologic staging: pTis, pNX  DCISRT - low risk  XRT - completed 8/6/18 - Dr. Judi Mac hormonal therapy      There is no FH of breast or ovarian cancer. 8/2/21 - Bilateral mammogram 3D - BIRADS 2      OB History    No obstetric history on file. Obstetric Comments    Menarche:  8. LMP: June/July 2017. # of Children:  3. Age at Delivery of First Child:  34.   Hysterectomy/oophorectomy:  NO/NO. Breast Bx:  Yes, LEFT and RIGHT. Hx of Breast Feeding:  No  BCP:  Yes, in the past. Hormone therapy: No               Past Surgical History:   Procedure Laterality Date    HX BREAST LUMPECTOMY Right 5/15/2018    RIGHT BREAST LUMPECTOMY WITH NEEDLE LOCALIZATION performed by Duncan Still MD at [address] [place] [address]    Physical Exam  Constitutional:       Appearance: Normal appearance. Chest:   Breasts:     Right: No mass, nipple discharge, skin change or tenderness. Left: No mass, nipple discharge, skin change or tenderness. Comments: Well healed incision to RIGHT UOQ and nodular, but stable and negative on imaging. Dense, fibrocystic breasts bilaterally   Musculoskeletal:      Comments: FROM - UE x 2   Lymphadenopathy:      Upper Body:      Right upper body: No supraclavicular or axillary adenopathy. Left upper body: No supraclavicular or axillary adenopathy.    Neurological:      Mental Status: She is alert. Psychiatric:         Attention and Perception: Attention normal.         Mood and Affect: Mood normal.         Speech: Speech normal.         Behavior: Behavior normal.       Visit Vitals  Ht 5' 6\" (1.676 m)   Wt 134 lb (60.8 kg)   LMP 07/01/2017   BMI 21.63 kg/m²         ASSESSMENT and PLAN    ICD-10-CM ICD-9-CM    1. Ductal carcinoma in situ (DCIS) of right breast  D05.11 233.0       2. S/P lumpectomy, right breast  Z98.890 V45.89       3. S/P radiation therapy  Z92.3 V66.1       4. History of breast cancer in female  Z80.3 V10.3 YOSSI 3D JOSE RAMON W MAMMO BI DX INCL CAD          Normal exam and imaging with no evidence of local recurrence. Reviewed normal post treatment changes. Nodular UOQ is stable and negative on imaging. Continues to see Dr. Shruti Cordova. Some residual chest wall/rib pain secondary to XRT, but is tolerable. BDmammogram 3D in 9/2022 - done in [address] [place] [address] in 1 year. RTC in 1 year or sooner PRN. She is comfortable with this plan. All questions answered and she stated understanding. Total time spent for this patient - 20 minutes.

## 2023-02-01 ENCOUNTER — HOSPITAL ENCOUNTER (OUTPATIENT)
Dept: RADIATION THERAPY | Age: 58
Discharge: HOME | End: 2023-02-01

## 2023-02-01 RX ORDER — CYCLOBENZAPRINE HCL 5 MG
5 TABLET ORAL
Qty: 30 TABLET | Refills: 2 | Status: SHIPPED | OUTPATIENT
Start: 2023-02-01

## 2023-04-21 DIAGNOSIS — Z85.3 HISTORY OF BREAST CANCER IN FEMALE: Primary | ICD-10-CM

## 2023-05-17 RX ORDER — IBUPROFEN 200 MG
TABLET ORAL EVERY 6 HOURS PRN
COMMUNITY

## 2023-05-17 RX ORDER — CYCLOBENZAPRINE HCL 5 MG
5 TABLET ORAL 3 TIMES DAILY PRN
COMMUNITY
Start: 2023-02-01

## 2023-09-25 DIAGNOSIS — Z85.3 PERSONAL HISTORY OF MALIGNANT NEOPLASM OF BREAST: Primary | ICD-10-CM

## 2023-10-10 ENCOUNTER — OFFICE VISIT (OUTPATIENT)
Age: 58
End: 2023-10-10
Payer: COMMERCIAL

## 2023-10-10 VITALS — BODY MASS INDEX: 21.53 KG/M2 | HEIGHT: 66 IN | WEIGHT: 134 LBS

## 2023-10-10 DIAGNOSIS — Z98.890 S/P LUMPECTOMY OF BREAST: ICD-10-CM

## 2023-10-10 DIAGNOSIS — D05.11 INTRADUCTAL CARCINOMA IN SITU OF RIGHT BREAST: Primary | ICD-10-CM

## 2023-10-10 DIAGNOSIS — Z92.3 S/P RADIATION THERAPY: ICD-10-CM

## 2023-10-10 DIAGNOSIS — Z85.3 HISTORY OF BREAST CANCER IN FEMALE: ICD-10-CM

## 2023-10-10 PROCEDURE — 99213 OFFICE O/P EST LOW 20 MIN: CPT | Performed by: NURSE PRACTITIONER

## 2024-03-28 ENCOUNTER — HOSPITAL ENCOUNTER (OUTPATIENT)
Facility: HOSPITAL | Age: 59
Discharge: HOME | End: 2024-03-31

## 2024-03-28 VITALS
WEIGHT: 138 LBS | BODY MASS INDEX: 22.18 KG/M2 | HEART RATE: 87 BPM | DIASTOLIC BLOOD PRESSURE: 66 MMHG | HEIGHT: 66 IN | SYSTOLIC BLOOD PRESSURE: 119 MMHG

## 2024-03-28 ASSESSMENT — PAIN SCALES - GENERAL: PAINLEVEL_OUTOF10: 0

## (undated) DEVICE — INFECTION CONTROL KIT SYS

## (undated) DEVICE — SUT SLK 2-0SH 30IN BLK --

## (undated) DEVICE — CHEST PACK: Brand: MEDLINE INDUSTRIES, INC.

## (undated) DEVICE — APPLICATOR BNDG 1MM ADH PREMIERPRO EXOFIN

## (undated) DEVICE — LIGHT HANDLE: Brand: DEVON

## (undated) DEVICE — DEVON™ KNEE AND BODY STRAP 60" X 3" (1.5 M X 7.6 CM): Brand: DEVON

## (undated) DEVICE — SUTURE VCRL SZ 3-0 L27IN ABSRB UD L26MM SH 1/2 CIR J416H

## (undated) DEVICE — SMOKE EVACUATION PENCIL: Brand: VALLEYLAB

## (undated) DEVICE — SOLUTION IV 1000ML 0.9% SOD CHL

## (undated) DEVICE — NEEDLE HYPO 22GA L1.5IN BLK S STL HUB POLYPR SHLD REG BVL

## (undated) DEVICE — Z INACTIVE USE 2535481 MARKER SURG W2XH1XL2CM BIOZORB LO PROF

## (undated) DEVICE — INSULATED BLADE ELECTRODE: Brand: EDGE

## (undated) DEVICE — DEVICE TRNSF SPIK STL 2008S] MICROTEK MEDICAL INC]

## (undated) DEVICE — KENDALL SCD EXPRESS SLEEVES, KNEE LENGTH, MEDIUM: Brand: KENDALL SCD

## (undated) DEVICE — STERILE POLYISOPRENE POWDER-FREE SURGICAL GLOVES: Brand: PROTEXIS

## (undated) DEVICE — SUTURE MCRYL SZ 4-0 L27IN ABSRB UD L19MM PS-2 1/2 CIR PRIM Y426H

## (undated) DEVICE — SUTURE PDS II SZ 2-0 L27IN ABSRB VLT SH L26MM 1/2 CIR Z317H